# Patient Record
Sex: MALE | Race: WHITE | NOT HISPANIC OR LATINO | URBAN - METROPOLITAN AREA
[De-identification: names, ages, dates, MRNs, and addresses within clinical notes are randomized per-mention and may not be internally consistent; named-entity substitution may affect disease eponyms.]

---

## 2017-01-06 ENCOUNTER — NEW PATIENT (OUTPATIENT)
Dept: URBAN - METROPOLITAN AREA CLINIC 85 | Facility: CLINIC | Age: 72
End: 2017-01-06
Payer: MEDICARE

## 2017-01-06 DIAGNOSIS — H25.13 AGE-RELATED NUCLEAR CATARACT, BILATERAL: Primary | ICD-10-CM

## 2017-01-06 PROCEDURE — 92004 COMPRE OPH EXAM NEW PT 1/>: CPT | Performed by: OPTOMETRIST

## 2017-01-06 ASSESSMENT — KERATOMETRY
OS: 42.25
OD: 42.25

## 2017-01-06 ASSESSMENT — INTRAOCULAR PRESSURE
OD: 15
OS: 15

## 2017-01-06 ASSESSMENT — VISUAL ACUITY
OD: 20/20
OS: 20/20

## 2018-03-19 ENCOUNTER — FOLLOW UP ESTABLISHED (OUTPATIENT)
Dept: URBAN - METROPOLITAN AREA CLINIC 85 | Facility: CLINIC | Age: 73
End: 2018-03-19
Payer: MEDICARE

## 2018-03-19 DIAGNOSIS — H35.372 PUCKERING OF MACULA, LEFT EYE: ICD-10-CM

## 2018-03-19 PROCEDURE — 92014 COMPRE OPH EXAM EST PT 1/>: CPT | Performed by: OPTOMETRIST

## 2018-03-19 ASSESSMENT — VISUAL ACUITY
OD: 20/20
OS: 20/20

## 2018-03-19 ASSESSMENT — INTRAOCULAR PRESSURE
OD: 18
OS: 18

## 2021-11-24 ENCOUNTER — OFFICE VISIT (OUTPATIENT)
Dept: SURGERY | Age: 76
End: 2021-11-24
Payer: COMMERCIAL

## 2021-11-24 VITALS
HEART RATE: 73 BPM | DIASTOLIC BLOOD PRESSURE: 74 MMHG | OXYGEN SATURATION: 99 % | HEIGHT: 72 IN | SYSTOLIC BLOOD PRESSURE: 140 MMHG | TEMPERATURE: 97.7 F | WEIGHT: 178.3 LBS | BODY MASS INDEX: 24.15 KG/M2 | RESPIRATION RATE: 20 BRPM

## 2021-11-24 DIAGNOSIS — C44.92 SCC (SQUAMOUS CELL CARCINOMA): Primary | ICD-10-CM

## 2021-11-24 PROCEDURE — 99214 OFFICE O/P EST MOD 30 MIN: CPT | Performed by: PLASTIC SURGERY

## 2021-11-24 NOTE — PROGRESS NOTES
Department of Plastic Surgery - Adult  Attending Consult Note      CHIEF COMPLAINT:   Squamous Cell Cancer of right hand    History Obtained From:  patient    HISTORY OF PRESENT ILLNESS:                The patient is a 76 y.o. 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 The patient states he noticed this lesion for several months prior to having it biopsied by his dermatologist.  He states he is never had any surgical intervention to this area previously however he has been using Efudex to the area which was prescribed to him previously by the [address] [place] [address].  He states he is otherwise healthy and is following with his primary care physician in the coming weeks. He presents her office today at the request of his dermatologist for excision of the right dorsal hand squamous cell carcinoma    Past Medical History:    Past Medical History:   Diagnosis Date    Known health problems: none     none per pt     Past Surgical History:    Past Surgical History:   Procedure Laterality Date    HERNIA REPAIR      abdominal in 2003 or 2004 per pt    LEG SURGERY      right leg scc       Current Medications:       No current outpatient medications on file. No current facility-administered medications for this visit. Allergies:  Patient has no known allergies.     Social History:   Social History     Socioeconomic History    Marital status: Unknown     Spouse name: Not on file    Number of children: Not on file    Years of education: Not on file    Highest education level: Not on file   Occupational History    Not on file   Tobacco Use    Smoking status: Current Every Day Smoker     Types: Cigarettes    Smokeless tobacco: Never Used    Tobacco comment: smokes 5-6 cigs daily   Vaping Use    Vaping Use: Never used   Substance and Sexual Activity    Alcohol use: Not Currently    Drug use: Never    Sexual activity: Not on file   Other Topics Concern    Not on file   Social History Narrative    Not on file     Social Determinants of Health     Financial Resource Strain:     Difficulty of Paying Living Expenses: Not on file   Food Insecurity:     Worried About Running Out of Food in the Last Year: Not on file    Estella of Food in the Last Year: Not on file   Transportation Needs:     Lack of Transportation (Medical): Not on file    Lack of Transportation (Non-Medical): Not on file   Physical Activity:     Days of Exercise per Week: Not on file    Minutes of Exercise per Session: Not on file   Stress:     Feeling of Stress : Not on file   Social Connections:     Frequency of Communication with Friends and Family: Not on file    Frequency of Social Gatherings with Friends and Family: Not on file    Attends Confucianist Services: Not on file    Active Member of [address] [place] [address] [employer] or Organizations: Not on file    Attends Club or Organization Meetings: Not on file    Marital Status: Not on file   Intimate Partner Violence:     Fear of Current or Ex-Partner: Not on file    Emotionally Abused: Not on file    Physically Abused: Not on file    Sexually Abused: Not on file   Housing Stability:     Unable to Pay for Housing in the Last Year: Not on file    Number of [place] in the Last Year: Not on file    Unstable Housing in the Last Year: Not on file     Family History:   Family History   Problem Relation Age of Onset    No Known Problems Mother     Pacemaker Father        REVIEW OF SYSTEMS:    CONSTITUTIONAL:  negative for  fevers, chills, sweats and fatigue  EYES: negative for dipolpia or acute vision loss. RESPIRATORY:  negative for  dry cough, cough with sputum, dyspnea, wheezing and chest pain  HENT:negative for pain, headache, difficulty swallowing or nose bleeds.   CARDIOVASCULAR:  negative for  chest pain, dyspnea, palpitations, syncope  GASTROINTESTINAL:  negative for nausea, vomiting, change in bowel habits, diarrhea, constipation and abdominal pain  EXTREMITIES: negative for edema  MUSCULOSKELETAL: negative for muscle weakness  SKIN: positive for lesion, negative for itching or rashes. HEME: negative for easy brusing or bleeding  PSYCH: Alert and oriented to person place and time    I have reviewed the chief complaint and history of present illness including (ROS and PFSH) and vital documentation by my staff and I agree with their documentation and have added when applicable. PHYSICAL EXAM:    VITALS:  BP (!) 140/74 (Site: Left Upper Arm, Position: Sitting, Cuff Size: Medium Adult)   Pulse 73   Temp 97.7 °F (36.5 °C) (Infrared)   Resp 20   Ht 6' (1.829 m)   Wt 178 lb 4.8 oz (80.9 kg)   SpO2 99%   BMI 24.18 kg/m²   CONSTITUTIONAL:  awake, alert, cooperative, no apparent distress, and appears stated age  EYES: PERRLA, EOMI, no signs of occular infection  LUNGS:  No increased work of breathing, good air exchange, clear to auscultation bilaterally, no crackles or wheezing  CARDIOVASCULAR:  Normal apical impulse, regular rate and rhythm  EXTREMITIES: no signs of clubbing or cyanosis. MUSCULOSKELETAL: negative for flaccid muscle tone or spastic movements. NEURO: Cranial nerves II-XII grossly intact. No signs of agitated mood. SKIN: Biopsy proven squamous cell carcinoma right dorsal hand-  20mm x 20mm,  pink in color, irregular border, raised, no signs of bleeding,drainage or infection. Non tender to palpation.      Right dorsal hand squamous cell carcinoma 20 mm round  DATA:    Labs: CBC: No results found for: WBC, RBC, HGB, HCT, MCV, MCH, MCHC, RDW, PLT, MPV  BMP:  No results found for: NA, K, CL, CO2, BUN, LABALBU, CREATININE, CALCIUM, GFRAA, LABGLOM, GLUCOSE    Radiology Review:  No radiology needed at this time  Pathology Review:  Pathology reviewed     IMPRESSION/RECOMMENDATIONS:        Diagnosis  -) Right dorsal hand squamous cell carcinoma      -The patient was counseled on their pathology results and the need for surgical   intervention.   -We will plan to proceed and have the lesion formely excised with margins in the OR. -The patient will  require frozen sections in the operating room  -The patient will  require pre-op clearance from their PCP. -The risks, benefits and options were discussed with the pt. The risks included but not limited to pain, bleeding, infection, heavy scarring, damage to surrounding structures, fluid collections, asymmetry, and need for further procedures. All of His questions were answered to their satisfaction and He agrees to proceed with the procedure. Photos obtained    Surgical plan: Excision of right dorsal hand screen cell carcinoma with possible full-thickness skin graft    This document is generated, in part, by voice recognition software and thus  syntax and grammatical errors are possible.     Petrona Garcia MD  1:18 PM  12/1/2021

## 2021-11-29 ENCOUNTER — TELEPHONE (OUTPATIENT)
Dept: SURGERY | Age: 76
End: 2021-11-29

## 2021-11-30 NOTE — TELEPHONE ENCOUNTER
12/6/2021 9:30 [facility] prior to [facility]    Excision squamous cell carcinoma right dorsal hand with possible full thickness skin graft

## 2021-12-03 NOTE — TELEPHONE ENCOUNTER
Medical Clearance faxed to [facility] dept. Procedure: ;excision squamous cell carcinoma right dorsal hand with possible full thickness skin graft     Surgery has been scheduled at [address] on 12/21/2021, Pre-Admission Testing will call you prior to surgery to inform you arrival time and any other additional directions,if they are unable to reach you,please call them two days prior at [phone]. If taking Fish Oil, Vitamins, two weeks prior to surgery stop taking. If taking NSAIDS (such as Aspirin, Ibuprofen) anticoagulants please consult with your prescribing physician to get further instructions on when to stop medication prior to surgery that is scheduled, patient understood. Pre-Auth #:[facility]#[id number] no prior auth required per [employer]  CPT Codes: 34684,92766,07129        Phone discussion was had with the patient today regarding upcoming surgery. I had a discussion with the patient that although the patient's insurance company has approved the procedure proposed, there is no guarantee of payment by [employer] on their final review following the procedure. This is also reflected in the letter received by this office and the patient from the insurance company. The patient has voiced to me complete understanding of this scenario, understands that they will be responsible for their individual deductable/coinsurance balance as an out-of-pocket expense and possibly the financial responsibility of the surgical procedure if the patient's insurance company elects not to pay for certain or all services. The patient elects to proceed with the proposed surgical plan.

## 2021-12-06 NOTE — TELEPHONE ENCOUNTER
Pt stopped in today to let us know he just left the [address] and his pre op med clear will be here today or tomorrow.

## 2021-12-09 NOTE — PROGRESS NOTES
Patient agreed to COVID test on 12/16 at the  [facility], between the hours of 7 am- 3 pm, second floor located at  [place] [address]. Patient instructed to bring ID. Patient instructed to self isolate until day of surgery.

## 2021-12-17 ENCOUNTER — HOSPITAL ENCOUNTER (OUTPATIENT)
Age: 76
Discharge: HOME | End: 2021-12-19
Payer: COMMERCIAL

## 2021-12-17 DIAGNOSIS — U07.1 COVID-19: ICD-10-CM

## 2021-12-17 LAB — SARS-COV-2, PCR: NOT DETECTED

## 2021-12-17 PROCEDURE — U0003 INFECTIOUS AGENT DETECTION BY NUCLEIC ACID (DNA OR RNA); SEVERE ACUTE RESPIRATORY SYNDROME CORONAVIRUS 2 (SARS-COV-2) (CORONAVIRUS DISEASE [COVID-19]), AMPLIFIED PROBE TECHNIQUE, MAKING USE OF HIGH THROUGHPUT TECHNOLOGIES AS DESCRIBED BY CMS-2020-01-R: HCPCS

## 2021-12-17 PROCEDURE — U0005 INFEC AGEN DETEC AMPLI PROBE: HCPCS

## 2021-12-17 RX ORDER — B-COMPLEX WITH VITAMIN C
TABLET ORAL
COMMUNITY

## 2021-12-17 RX ORDER — MULTIVIT WITH MINERALS/LUTEIN
250 TABLET ORAL DAILY
COMMUNITY

## 2021-12-17 RX ORDER — MULTIVIT-MIN/IRON/FOLIC ACID/K 18-600-40
CAPSULE ORAL
COMMUNITY

## 2021-12-17 RX ORDER — VITAMIN B COMPLEX
1 CAPSULE ORAL DAILY
COMMUNITY

## 2021-12-17 NOTE — PROGRESS NOTES
[facility] PRE-ADMISSION TESTING GENERAL INSTRUCTIONS- [facility]-phone number:[phone]    GENERAL INSTRUCTIONS  [x] Antibacterial Soap shower Night before and/or AM of Surgery  [] Jake wipe instruction sheet and wipes given. [x] Nothing by mouth after midnight, including gum, candy, mints, or water. [x] You may brush your teeth, gargle, but do NOT swallow water. []Hibiclens shower  the night before and the morning of surgery. Do not use             Hibiclens on your face or head. [x]No smoking, chewing tobacco, illegal drugs, or alcohol within 24 hours of your surgery. [x] Jewelry, valuables or body piercing's should not be brought to the hospital. All body and/or tongue piercing's must be removed prior to arriving to hospital.  ALL hair pins must be removed. [x] Do not wear makeup, lotions, powders, deodorant. Nail polish as directed by the nurse. [x] Arrange transportation with a responsible adult  to and from the hospital. If you do not have a responsible adult  to transport you, you will need to make arrangements with a medical transportation company (i.e. [employer]. A Uber/taxi/bus is not appropriate unless you are accompanied by a responsible adult ). Arrange for someone to be with you for the remainder of the day and for 24 hours after your procedure due to having had anesthesia. Who will be your  for transportation? _MARLENE_________________   Who will be staying with you for 24 hrs after your procedure?_____MARLENE____________  [x] Bring insurance card and photo ID.  [] Transfusion Bracelet: Please bring with you to hospital, day of surgery  [] Bring urine specimen day of surgery. Any small container is acceptable. [] Use inhalers the morning of surgery and bring with you to hospital.  [] Bring copy of living will or healthcare power of  papers to be placed in your electronic record.   [] CPAP/BI-PAP: Please bring your machine if you are to spend the night in the hospital.     PARKING [id number]:   [x] Arrival Time:__0530___________  · [x] Parking lot '\"I\"  is located on [facility] (the corner of [facility] and [facility]). To enter, press the button and the gate will lift. A free token will be provided to exit the lot. One car per patient is allowed to park in this lot. All other cars are to park on [address] [place] [address] either in the parking garage or the handicap lot. [] To reach the [facility] lobby from [address] [place] [address], upon entering the hospital, take elevator B to the 3rd floor. EDUCATION INSTRUCTIONS:      [] Knee or hip replacement booklet & exercise pamphlets given. [] [address] placed in chart. [] Pre-admission Testing educational folder given  [] Incentive Spirometry,coughing & deep breathing exercises reviewed. []Medication information sheet(s)   []Fluoroscopy-Xray used in surgery reviewed with patient. Educational pamphlet placed in chart. [x]Pain: Post-op pain is normal and to be expected. You will be asked to rate your pain from 0-10(a zero is not acceptable-education is needed). Your post-op pain goal is:2  [] Ask your nurse for your pain medication. [] Joint camp offered. [] Joint replacement booklets given. [] Other:___________________________    MEDICATION INSTRUCTIONS:   [x]Bring a complete list of your medications, please write the last time you took the medicine, give this list to the nurse.   [] Take the following medications the morning of surgery with 1-2 ounces of water:   [x] Stop herbal supplements and vitamins 5 days before your surgery. [] DO NOT take any diabetic medicine the morning of surgery. Follow instructions for insulin the day before surgery. [] If you are diabetic and your blood sugar is low or you feel symptomatic, you may drink 1-2 ounces of apple juice or take a glucose tablet.   The morning of your procedure, you may call the pre-op area if you have concerns about your blood sugar [phone]. [] Use your inhalers the morning of surgery. Bring your emergency inhaler with you day of surgery. [] Follow physician instructions regarding any blood thinners you may be taking. WHAT TO EXPECT:  [x] The day of surgery you will be greeted and checked in by the [employer].  In addition, you will be registered in the [facility] by a Patient Access Representative. Please bring your photo ID and insurance card. A nurse will greet you in accordance to the time you are needed in the pre-op area to prepare you for surgery. Please do not be discouraged if you are not greeted in the order you arrive as there are many variables that are involved in patient preparation. Your patience is greatly appreciated as you wait for your nurse. Please bring in items such as: books, magazines, newspapers, electronics, or any other items  to occupy your time in the waiting area. [x]  Delays may occur with surgery and staff will make a sincere effort to keep you informed of delays. If any delays occur with your procedure, we apologize ahead of time for your inconvenience as we recognize the value of your time.

## 2021-12-20 NOTE — H&P
Sexual Activity    Alcohol use: Not Currently    Drug use: Never    Sexual activity: Not on file   Other Topics Concern    Not on file   Social History Narrative    Not on file      Social Determinants of Health          Financial Resource Strain:     Difficulty of Paying Living Expenses: Not on file   Food Insecurity:     Worried About Running Out of Food in the Last Year: Not on file    Estella of Food in the Last Year: Not on file   Transportation Needs:     Lack of Transportation (Medical): Not on file    Lack of Transportation (Non-Medical): Not on file   Physical Activity:     Days of Exercise per Week: Not on file    Minutes of Exercise per Session: Not on file   Stress:     Feeling of Stress : Not on file   Social Connections:     Frequency of Communication with Friends and Family: Not on file    Frequency of Social Gatherings with Friends and Family: Not on file    Attends Muslim Services: Not on file    Active Member of [address] [place] [address] or Organizations: Not on file    Attends Club or Organization Meetings: Not on file    Marital Status: Not on file   Intimate Partner Violence:     Fear of Current or Ex-Partner: Not on file    Emotionally Abused: Not on file    Physically Abused: Not on file    Sexually Abused: Not on file   Housing Stability:     Unable to Pay for Housing in the Last Year: Not on file    Number of [place] in the Last Year: Not on file    Unstable Housing in the Last Year: Not on file         Family History:   Family History         Family History   Problem Relation Age of Onset    No Known Problems Mother      Pacemaker Father              REVIEW OF SYSTEMS:    CONSTITUTIONAL:  negative for  fevers, chills, sweats and fatigue  EYES: negative for dipolpia or acute vision loss. RESPIRATORY:  negative for  dry cough, cough with sputum, dyspnea, wheezing and chest pain  HENT:negative for pain, headache, difficulty swallowing or nose bleeds.   CARDIOVASCULAR: negative for  chest pain, dyspnea, palpitations, syncope  GASTROINTESTINAL:  negative for nausea, vomiting, change in bowel habits, diarrhea, constipation and abdominal pain  EXTREMITIES: negative for edema  MUSCULOSKELETAL: negative for muscle weakness  SKIN: positive for lesion, negative for itching or rashes. HEME: negative for easy brusing or bleeding  PSYCH: Alert and oriented to person place and time     I have reviewed the chief complaint and history of present illness including (ROS and PFSH) and vital documentation by my staff and I agree with their documentation and have added when applicable.        PHYSICAL EXAM:    VITALS:  BP (!) 140/74 (Site: Left Upper Arm, Position: Sitting, Cuff Size: Medium Adult)   Pulse 73   Temp 97.7 °F (36.5 °C) (Infrared)   Resp 20   Ht 6' (1.829 m)   Wt 178 lb 4.8 oz (80.9 kg)   SpO2 99%   BMI 24.18 kg/m²   CONSTITUTIONAL:  awake, alert, cooperative, no apparent distress, and appears stated age  EYES: PERRLA, EOMI, no signs of occular infection  LUNGS:  No increased work of breathing, good air exchange, clear to auscultation bilaterally, no crackles or wheezing  CARDIOVASCULAR:  Normal apical impulse, regular rate and rhythm  EXTREMITIES: no signs of clubbing or cyanosis. MUSCULOSKELETAL: negative for flaccid muscle tone or spastic movements. NEURO: Cranial nerves II-XII grossly intact. No signs of agitated mood.      SKIN: Biopsy proven squamous cell carcinoma right dorsal hand-  20mm x 20mm,  pink in color, irregular border, raised, no signs of bleeding,drainage or infection.  Non tender to palpation.      Right dorsal hand squamous cell carcinoma 20 mm round  DATA:    Labs: CBC: No results found for: WBC, RBC, HGB, HCT, MCV, MCH, MCHC, RDW, PLT, MPV  BMP:  No results found for: NA, K, CL, CO2, BUN, LABALBU, CREATININE, CALCIUM, GFRAA, LABGLOM, GLUCOSE     Radiology Review:  No radiology needed at this time  Pathology Review:  Pathology reviewed    IMPRESSION/RECOMMENDATIONS:          Diagnosis  -) Right dorsal hand squamous cell carcinoma        -The patient was counseled on their pathology results and the need for surgical   intervention.   -We will plan to proceed and have the lesion formely excised with margins in the OR. -The patient will  require frozen sections in the operating room  -The patient will  require pre-op clearance from their PCP.          -The risks, benefits and options were discussed with the pt. The risks included but not limited to pain, bleeding, infection, heavy scarring, damage to surrounding structures, fluid collections, asymmetry, and need for further procedures. All of His questions were answered to their satisfaction and He agrees to proceed with the procedure.     Photos obtained     Surgical plan: Excision of right dorsal hand screen cell carcinoma with possible full-thickness skin graft     Attestation    No change in patient's H & P. I have reviewed the procedure with the patient as well as the risk and benefits. They have no further questions and agree to proceed with surgery.     Ro Smalls MD   7:19 AM  12/21/2021

## 2021-12-21 ENCOUNTER — HOSPITAL ENCOUNTER (OUTPATIENT)
Age: 76
Setting detail: OUTPATIENT SURGERY
Discharge: HOME | End: 2021-12-21
Attending: PLASTIC SURGERY | Admitting: PLASTIC SURGERY
Payer: COMMERCIAL

## 2021-12-21 ENCOUNTER — ANESTHESIA (OUTPATIENT)
Dept: OPERATING ROOM | Age: 76
End: 2021-12-21
Payer: COMMERCIAL

## 2021-12-21 ENCOUNTER — ANESTHESIA EVENT (OUTPATIENT)
Dept: OPERATING ROOM | Age: 76
End: 2021-12-21
Payer: COMMERCIAL

## 2021-12-21 VITALS
DIASTOLIC BLOOD PRESSURE: 68 MMHG | HEART RATE: 58 BPM | HEIGHT: 72 IN | BODY MASS INDEX: 24.38 KG/M2 | TEMPERATURE: 97.1 F | RESPIRATION RATE: 14 BRPM | OXYGEN SATURATION: 99 % | SYSTOLIC BLOOD PRESSURE: 139 MMHG | WEIGHT: 180 LBS

## 2021-12-21 VITALS — DIASTOLIC BLOOD PRESSURE: 67 MMHG | OXYGEN SATURATION: 99 % | SYSTOLIC BLOOD PRESSURE: 131 MMHG

## 2021-12-21 DIAGNOSIS — G89.18 POST-OP PAIN: ICD-10-CM

## 2021-12-21 DIAGNOSIS — U07.1 COVID-19: Primary | ICD-10-CM

## 2021-12-21 PROCEDURE — 6370000000 HC RX 637 (ALT 250 FOR IP): Performed by: PLASTIC SURGERY

## 2021-12-21 PROCEDURE — 6360000002 HC RX W HCPCS: Performed by: PHYSICIAN ASSISTANT

## 2021-12-21 PROCEDURE — 11622 EXC S/N/H/F/G MAL+MRG 1.1-2: CPT | Performed by: PLASTIC SURGERY

## 2021-12-21 PROCEDURE — 2500000003 HC RX 250 WO HCPCS: Performed by: PLASTIC SURGERY

## 2021-12-21 PROCEDURE — 2709999900 HC NON-CHARGEABLE SUPPLY: Performed by: PLASTIC SURGERY

## 2021-12-21 PROCEDURE — 3700000000 HC ANESTHESIA ATTENDED CARE: Performed by: PLASTIC SURGERY

## 2021-12-21 PROCEDURE — 7100000010 HC PHASE II RECOVERY - FIRST 15 MIN: Performed by: PLASTIC SURGERY

## 2021-12-21 PROCEDURE — 6360000002 HC RX W HCPCS: Performed by: NURSE ANESTHETIST, CERTIFIED REGISTERED

## 2021-12-21 PROCEDURE — 2580000003 HC RX 258: Performed by: NURSE ANESTHETIST, CERTIFIED REGISTERED

## 2021-12-21 PROCEDURE — 15240 FTH/GFT F/C/C/M/N/AX/G/H/F20: CPT | Performed by: PLASTIC SURGERY

## 2021-12-21 PROCEDURE — 3700000001 HC ADD 15 MINUTES (ANESTHESIA): Performed by: PLASTIC SURGERY

## 2021-12-21 PROCEDURE — 88331 PATH CONSLTJ SURG 1 BLK 1SPC: CPT

## 2021-12-21 PROCEDURE — 3600000002 HC SURGERY LEVEL 2 BASE: Performed by: PLASTIC SURGERY

## 2021-12-21 PROCEDURE — 3600000012 HC SURGERY LEVEL 2 ADDTL 15MIN: Performed by: PLASTIC SURGERY

## 2021-12-21 PROCEDURE — 88305 TISSUE EXAM BY PATHOLOGIST: CPT

## 2021-12-21 PROCEDURE — 2580000003 HC RX 258: Performed by: PHYSICIAN ASSISTANT

## 2021-12-21 PROCEDURE — 7100000011 HC PHASE II RECOVERY - ADDTL 15 MIN: Performed by: PLASTIC SURGERY

## 2021-12-21 RX ORDER — DEXAMETHASONE SODIUM PHOSPHATE 10 MG/ML
INJECTION INTRAMUSCULAR; INTRAVENOUS PRN
Status: DISCONTINUED | OUTPATIENT
Start: 2021-12-21 | End: 2021-12-21 | Stop reason: SDUPTHER

## 2021-12-21 RX ORDER — SODIUM CHLORIDE 9 MG/ML
INJECTION, SOLUTION INTRAVENOUS CONTINUOUS PRN
Status: DISCONTINUED | OUTPATIENT
Start: 2021-12-21 | End: 2021-12-21 | Stop reason: SDUPTHER

## 2021-12-21 RX ORDER — DIAPER,BRIEF,INFANT-TODD,DISP
EACH MISCELLANEOUS PRN
Status: DISCONTINUED | OUTPATIENT
Start: 2021-12-21 | End: 2021-12-21 | Stop reason: ALTCHOICE

## 2021-12-21 RX ORDER — BACITRACIN 500 [USP'U]/G
OINTMENT OPHTHALMIC
Qty: 1 EACH | Refills: 0 | Status: SHIPPED | OUTPATIENT
Start: 2021-12-21

## 2021-12-21 RX ORDER — SODIUM CHLORIDE 0.9 % (FLUSH) 0.9 %
5-40 SYRINGE (ML) INJECTION PRN
Status: DISCONTINUED | OUTPATIENT
Start: 2021-12-21 | End: 2021-12-21 | Stop reason: HOSPADM

## 2021-12-21 RX ORDER — PROPOFOL 10 MG/ML
INJECTION, EMULSION INTRAVENOUS CONTINUOUS PRN
Status: DISCONTINUED | OUTPATIENT
Start: 2021-12-21 | End: 2021-12-21 | Stop reason: SDUPTHER

## 2021-12-21 RX ORDER — ONDANSETRON 2 MG/ML
INJECTION INTRAMUSCULAR; INTRAVENOUS PRN
Status: DISCONTINUED | OUTPATIENT
Start: 2021-12-21 | End: 2021-12-21 | Stop reason: SDUPTHER

## 2021-12-21 RX ORDER — LIDOCAINE HYDROCHLORIDE AND EPINEPHRINE 10; 10 MG/ML; UG/ML
INJECTION, SOLUTION INFILTRATION; PERINEURAL PRN
Status: DISCONTINUED | OUTPATIENT
Start: 2021-12-21 | End: 2021-12-21 | Stop reason: ALTCHOICE

## 2021-12-21 RX ORDER — SODIUM CHLORIDE 0.9 % (FLUSH) 0.9 %
5-40 SYRINGE (ML) INJECTION EVERY 12 HOURS SCHEDULED
Status: DISCONTINUED | OUTPATIENT
Start: 2021-12-21 | End: 2021-12-21 | Stop reason: HOSPADM

## 2021-12-21 RX ORDER — FENTANYL CITRATE 50 UG/ML
INJECTION, SOLUTION INTRAMUSCULAR; INTRAVENOUS PRN
Status: DISCONTINUED | OUTPATIENT
Start: 2021-12-21 | End: 2021-12-21 | Stop reason: SDUPTHER

## 2021-12-21 RX ORDER — CEPHALEXIN 500 MG/1
500 CAPSULE ORAL 4 TIMES DAILY
Qty: 20 CAPSULE | Refills: 0 | Status: SHIPPED | OUTPATIENT
Start: 2021-12-21 | End: 2021-12-26

## 2021-12-21 RX ORDER — SODIUM CHLORIDE 9 MG/ML
25 INJECTION, SOLUTION INTRAVENOUS PRN
Status: DISCONTINUED | OUTPATIENT
Start: 2021-12-21 | End: 2021-12-21 | Stop reason: HOSPADM

## 2021-12-21 RX ORDER — LIDOCAINE HYDROCHLORIDE 20 MG/ML
INJECTION, SOLUTION INTRAVENOUS PRN
Status: DISCONTINUED | OUTPATIENT
Start: 2021-12-21 | End: 2021-12-21 | Stop reason: SDUPTHER

## 2021-12-21 RX ORDER — HYDROCODONE BITARTRATE AND ACETAMINOPHEN 5; 325 MG/1; MG/1
1 TABLET ORAL EVERY 6 HOURS PRN
Qty: 10 TABLET | Refills: 0 | Status: SHIPPED | OUTPATIENT
Start: 2021-12-21 | End: 2021-12-28

## 2021-12-21 RX ADMIN — DEXAMETHASONE SODIUM PHOSPHATE 10 MG: 10 INJECTION INTRAMUSCULAR; INTRAVENOUS at 07:40

## 2021-12-21 RX ADMIN — SODIUM CHLORIDE: 9 INJECTION, SOLUTION INTRAVENOUS at 07:27

## 2021-12-21 RX ADMIN — Medication 2000 MG: at 07:39

## 2021-12-21 RX ADMIN — LIDOCAINE HYDROCHLORIDE 80 MG: 20 INJECTION, SOLUTION INTRAVENOUS at 07:36

## 2021-12-21 RX ADMIN — SODIUM CHLORIDE 25 ML: 9 INJECTION, SOLUTION INTRAVENOUS at 06:13

## 2021-12-21 RX ADMIN — PROPOFOL 75 MCG/KG/MIN: 10 INJECTION, EMULSION INTRAVENOUS at 07:36

## 2021-12-21 RX ADMIN — ONDANSETRON HYDROCHLORIDE 4 MG: 2 SOLUTION INTRAMUSCULAR; INTRAVENOUS at 07:44

## 2021-12-21 RX ADMIN — FENTANYL CITRATE 50 MCG: 50 INJECTION, SOLUTION INTRAMUSCULAR; INTRAVENOUS at 07:36

## 2021-12-21 ASSESSMENT — PULMONARY FUNCTION TESTS
PIF_VALUE: 1
PIF_VALUE: 0
PIF_VALUE: 1
PIF_VALUE: 1
PIF_VALUE: 0
PIF_VALUE: 1
PIF_VALUE: 0
PIF_VALUE: 1
PIF_VALUE: 0
PIF_VALUE: 1
PIF_VALUE: 0
PIF_VALUE: 1
PIF_VALUE: 0
PIF_VALUE: 1

## 2021-12-21 ASSESSMENT — LIFESTYLE VARIABLES: SMOKING_STATUS: 1

## 2021-12-21 ASSESSMENT — PAIN SCALES - GENERAL
PAINLEVEL_OUTOF10: 0

## 2021-12-21 ASSESSMENT — PAIN - FUNCTIONAL ASSESSMENT: PAIN_FUNCTIONAL_ASSESSMENT: 0-10

## 2021-12-21 NOTE — PROGRESS NOTES
Patient tolerating PO fluids. Family at bedside. Reviewed discharge instructions including discharge medications, wound care, follow up appointment and s/s of infection. Patient and wife verbalizes understanding. No questions at this time.

## 2021-12-21 NOTE — OP NOTE
[address]                  [address] [place],  [facility]                                OPERATIVE REPORT    PATIENT NAME: Lai Goodrich                         :        1945  MED REC NO:   [id number]                            ROOM:  ACCOUNT NO:   [de-identified]                           ADMIT DATE: 2021  PROVIDER:     Marley Morris DO    DATE OF PROCEDURE:  2021    PREOPERATIVE DIAGNOSIS:  Squamous cell carcinoma of the right dorsal  hand. POSTOPERATIVE DIAGNOSIS:  Squamous cell carcinoma of the right dorsal  hand. PROCEDURE:  Excision of squamous cell carcinoma of right dorsal hand  with full-thickness skin graft. Lesion was measured 12 x 12 mm, margin  was 4 mm, wound defect was 40 x 40 mm and full-thickness skin graft was  20 x 20 mm. SURGEON:  Anay Harden MD    ASSISTANTS:  Marley Morris DO, PGY-5 and Wendy Quinonez. The PA  was required due to the inadequate trained assistant who assisted in  prepping, draping, suturing, retracting and dressing. EBL:  5 mL. IV FLUID:  500 mL. ANESTHESIA:  General, MAC.    COMPLICATIONS:  None. SPECIMEN:  Were obtained. 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 Risks, benefits, and alternatives were  discussed. Risks including but not limited to pain, bleeding,  infection, damage to surrounding structures, seroma, hematoma, heavy  scarring, recurrence, and the need for further surgery. The patient  voiced understanding and elected proceed. DESCRIPTION OF PROCEDURE:  The patient was brought to the room, placed  supine on the operating table. SCDs were placed. Preoperative  antibiotic was administered. A time-out was done and was agreed by all  parties present. Anesthesia was induced without any complication.   The  patient was then prepped and draped in usual sterile fashion. The  lesion was then marked with 4 mm margins and then the skin was  infiltrated with 1% lidocaine with 1:200,000 epinephrine. The diameter  of the defect was also measured at the full-thickness skin graft site on  the right supraclavicular area and the skin was then infiltrated with 1%  lidocaine with 1:100,000 epinephrine. Attention was returned to the the  dorsal hand lesion. By using a 15-blade scalpel, incision was then made  perpendicular to the marked site and carried down to just above the  subcutaneous tissues. The lesion was then lifted up from the wound bed  uniformly and sent to pathology for frozen section. Intraoperative  report shows representative margins were negative of malignancy. Hemostasis was achieved with monopolar cautery and the wound was  irrigated with copious amounts of normal saline. Attention was then  returned to the right supraclavicular region with the full-thickness  skin graft site was marked and by using 15-blade scalpel, incision was  then made perpendicular to the skin and carried down to the subcutaneous  tissues. The graft was then lifted up from the wound bed and was  denuded from any subcutaneous and adipose tissues. This was then  fashioned and placed into the wound defect of a right dorsal hand. The  graft was then secured with 4-0 chromic in simple running fashion and a  bolster dressing with Xeroform was placed on top of the graft which was  also secured with several silk sutures. The supraclavicular area was  closed with 3-0 Monocryl in the deep dermal layer in simple interrupted  fashion. The skin was closed with 4-0 Monocryl in subcuticular running  fashion, was dressed with Dermabond and Steri-Strips. At this point,  the procedure deemed to be completed. The patient was then returned to  Anesthesia. He emerged anesthesia without any complication and sent to  PACU in stable addition.   Dr. Marisela Smart was present and scrubbed throughout  the entire case.         Mellisa Reyna DO    D: 12/21/2021 8:24:57       T: 12/21/2021 8:29:43     SANDRA/S_HECTOR_Scottie  Job#: [id number]     Doc#: [id number]    CC:

## 2021-12-21 NOTE — ANESTHESIA POSTPROCEDURE EVALUATION
Department of Anesthesiology  Postprocedure Note    Patient: Wanda Calabrese  MRN: [id number]  YOB: 1945  Date of evaluation: 12/21/2021  Time:  8:18 AM     Procedure Summary     Date: 12/21/21 Room / Location: [facility] OR 10 / [facility]    Anesthesia Start: 8940 Anesthesia Stop: 0818    Procedure: EXC OF SCC RIGHT FORSAL HAND WITH FULL THICKNES SKIN GRAFT (Right ) Diagnosis: (SQUAMOUS CELL CARCINOMA RIGHT DORSAL HAND)    Surgeons: Alexandra العراقي MD Responsible Provider: Edson Hodgson MD    Anesthesia Type: MAC ASA Status: 2          Anesthesia Type: MAC    Malu Phase I: Malu Score: 10    Malu Phase II:      Last vitals: Reviewed and per EMR flowsheets.        Anesthesia Post Evaluation    Patient location during evaluation: PACU  Patient participation: complete - patient participated  Level of consciousness: awake and alert  Pain score: 0  Airway patency: patent  Nausea & Vomiting: no nausea and no vomiting  Complications: no  Cardiovascular status: hemodynamically stable  Respiratory status: acceptable  Hydration status: euvolemic    KRISTA washburn - CRNA

## 2021-12-21 NOTE — ANESTHESIA PRE PROCEDURE
Department of Anesthesiology  Preprocedure Note       Name:  Wanda Calabrese   Age:  76 y.o.  :  1945                                          MRN:  [id number]         Date:  2021      Surgeon: Jerson Macdonald):  Alexandra العراقي MD    Procedure: Procedure(s):  EXCISION SQUAMOUS CELL CARCINOMA RIGHT DORSAL HAND WITH POSSIBLE FULL SKIN GRAFT. [facility]. [facility] WITH LOCAL ANESTHESIA    Vital Signs (Current)   Vitals:    21   BP: (!) 165/72   Pulse: 72   Resp: 16   Temp: 36.1 °C (96.9 °F)   SpO2: 98%     Vital Signs Statistics (for past 48 hrs)     Temp  Av.1 °C (96.9 °F)  Min: 36.1 °C (96.9 °F)   Min taken time: 21  Max: 36.1 °C (96.9 °F)   Max taken time: 21  Pulse  Av  Min: 67   Min taken time: 21  Max: 72   Max taken time: 21  Resp  Av  Min: 16   Min taken time: 21  Max: 16   Max taken time: 2148  BP  Min: 165/72   Min taken time: 21  Max: 165/72   Max taken time: 21  SpO2  Av %  Min: 98 %   Min taken time: 21  Max: 98 %   Max taken time: 21 0548    BP Readings from Last 3 Encounters:   21 (!) 165/72   21 (!) 140/74     BMI  Body mass index is 24.41 kg/m². Estimated body mass index is 24.41 kg/m² as calculated from the following:    Height as of this encounter: 6' (1.829 m). Weight as of this encounter: 180 lb (81.6 kg). CBC No results found for: WBC, RBC, HGB, HCT, MCV, RDW, PLT  CMP  No results found for: NA, K, CL, CO2, BUN, CREATININE, GFRAA, AGRATIO, LABGLOM, GLUCOSE, PROT, CALCIUM, BILITOT, ALKPHOS, AST, ALT  BMP  No results found for: NA, K, CL, CO2, BUN, CREATININE, CALCIUM, GFRAA, LABGLOM, GLUCOSE  POCGlucose  No results for input(s): GLUCOSE in the last 72 hours.    Coags  No results found for: PROTIME, INR, APTT  HCG (If Applicable) No results found for: PREGTESTUR, PREGSERUM, HCG, HCGQUANT   ABGs No results found for: PHART, PO2ART, VLC5FLH, PVS8RWE, BEART, V5PEGRZQ   Type & Screen (If Applicable)  No results found for: [address]  Radiology (If Applicable)  Cardiac Testing (If Applicable)   EKG (If Applicable)   Medications prior to admission:   Prior to Admission medications    Medication Sig Start Date End Date Taking? Authorizing Provider   cephALEXin (KEFLEX) 500 MG capsule Take 1 capsule by mouth 4 times daily for 5 days 12/21/21 12/26/21 Yes HELEN Nicholson   bacitracin 500 UNIT/GM ophthalmic ointment Place over eyelid incisions twice daily 12/21/21  Yes HELEN Nicholson   HYDROcodone-acetaminophen (NORCO) 5-325 MG per tablet Take 1 tablet by mouth every 6 hours as needed for Pain for up to 7 days. 12/21/21 12/28/21 Yes HELEN Nicholson   Cholecalciferol (VITAMIN D) 50 MCG (2000 UT) CAPS capsule Take by mouth   Yes Historical Provider, MD   Ascorbic Acid (VITAMIN C) 250 MG tablet Take 250 mg by mouth daily   Yes Historical Provider, MD   Zinc 100 MG TABS Take by mouth   Yes Historical Provider, MD   b complex vitamins capsule Take 1 capsule by mouth daily   Yes Historical Provider, MD       Current medications:    Current Facility-Administered Medications   Medication Dose Route Frequency Provider Last Rate Last Admin    sodium chloride flush 0.9 % injection 5-40 mL  5-40 mL IntraVENous 2 times per day HELEN Nicholson        sodium chloride flush 0.9 % injection 5-40 mL  5-40 mL IntraVENous PRN HELEN Nicholson        0.9 % sodium chloride infusion  25 mL IntraVENous PRN HELEN Nicholson 100 mL/hr at 12/21/21 0613 25 mL at 12/21/21 2861    ceFAZolin (ANCEF) 2000 mg in sterile water 20 mL IV syringe  2,000 mg IntraVENous See Admin Instructions HELEN Nicholson           Allergies:  No Known Allergies    Problem List:  There is no problem list on file for this patient.       Past Medical History:        Diagnosis Date    Known health problems: none     none per pt    Sleep apnea     BI-PAP       Past Surgical History:        Procedure Laterality Date    HERNIA REPAIR      abdominal in 2003 or 2004 per pt    LEG SURGERY      right leg scc         Social History:    Social History     Tobacco Use    Smoking status: Current Every Day Smoker     Types: Cigarettes    Smokeless tobacco: Never Used    Tobacco comment: smokes 5-6 cigs daily   Substance Use Topics    Alcohol use: Not Currently                                Ready to quit: Not Answered  Counseling given: Not Answered  Comment: smokes 5-6 cigs daily      Vital Signs (Current):   Vitals:    12/17/21 0935 12/21/21 0548   BP:  (!) 165/72   Pulse:  72   Resp:  16   Temp:  36.1 °C (96.9 °F)   TempSrc:  Temporal   SpO2:  98%   Weight: 180 lb (81.6 kg) 180 lb (81.6 kg)   Height: 6' (1.829 m) 6' (1.829 m)                                              BP Readings from Last 3 Encounters:   12/21/21 (!) 165/72   11/24/21 (!) 140/74       NPO Status: Time of last liquid consumption: 2200                        Time of last solid consumption: 2200                        Date of last liquid consumption: 12/20/21                        Date of last solid food consumption: 12/20/21    BMI:   Wt Readings from Last 3 Encounters:   12/21/21 180 lb (81.6 kg)   11/24/21 178 lb 4.8 oz (80.9 kg)     Body mass index is 24.41 kg/m². CBC: No results found for: WBC, RBC, HGB, HCT, MCV, RDW, PLT    CMP: No results found for: NA, K, CL, CO2, BUN, CREATININE, GFRAA, AGRATIO, LABGLOM, GLUCOSE, PROT, CALCIUM, BILITOT, ALKPHOS, AST, ALT    POC Tests: No results for input(s): POCGLU, POCNA, POCK, POCCL, POCBUN, POCHEMO, POCHCT in the last 72 hours. Coags: No results found for: PROTIME, INR, APTT    HCG (If Applicable): No results found for: PREGTESTUR, PREGSERUM, HCG, HCGQUANT     ABGs: No results found for: PHART, PO2ART, GBG4IYP, LWV4XXC, BEART, R2VWSTAE     Type & Screen (If Applicable):  No results found for: LABABO, LABRH    Drug/Infectious Status (If Applicable):   No results found for: HIV, HEPCAB    COVID-19 Screening (If Applicable):   Lab Results   Component Value Date    COVID19 Not Detected 12/16/2021           Anesthesia Evaluation  Patient summary reviewed and Nursing notes reviewed no history of anesthetic complications:   Airway: Mallampati: III  TM distance: >3 FB   Neck ROM: full  Mouth opening: < 3 FB Dental:    (+) caps      Pulmonary: breath sounds clear to auscultation  (+) sleep apnea: on CPAP,  current smoker          Patient did not smoke on day of surgery. Cardiovascular:  Exercise tolerance: good (>4 METS),                     Neuro/Psych:               GI/Hepatic/Renal:             Endo/Other:                     Abdominal:             Vascular: Other Findings:             Anesthesia Plan      MAC     ASA 2     (#20 L hand)  Induction: intravenous. Anesthetic plan and risks discussed with patient. Use of blood products discussed with patient whom. Plan discussed with attending.                   KRISTA Escalona - CRNA   12/21/2021

## 2021-12-28 ENCOUNTER — OFFICE VISIT (OUTPATIENT)
Dept: SURGERY | Age: 76
End: 2021-12-28

## 2021-12-28 VITALS — TEMPERATURE: 96.5 F

## 2021-12-28 DIAGNOSIS — C44.92 SCC (SQUAMOUS CELL CARCINOMA): Primary | ICD-10-CM

## 2021-12-28 PROCEDURE — 99024 POSTOP FOLLOW-UP VISIT: CPT | Performed by: PHYSICIAN ASSISTANT

## 2022-01-17 ENCOUNTER — VIRTUAL VISIT (OUTPATIENT)
Dept: SURGERY | Age: 77
End: 2022-01-17

## 2022-01-17 DIAGNOSIS — C44.92 SCC (SQUAMOUS CELL CARCINOMA): Primary | ICD-10-CM

## 2022-01-17 PROCEDURE — 99999 PR OFFICE/OUTPT VISIT,PROCEDURE ONLY: CPT | Performed by: PHYSICIAN ASSISTANT

## 2022-01-19 NOTE — PROGRESS NOTES
Kerri Samuels is a 68 y.o. male evaluated via telephone on 1/17/2022. Consent:  He and/or health care decision maker is aware that that he may receive a bill for this telephone service, depending on his insurance coverage, and has provided verbal consent to proceed: Yes    The patient was in the [address] during the entirety of the phone conversation. Documentation:  I communicated with the patient and/or health care decision maker about the pathology results from their most recent biopsy.    Details of this discussion including any medical advice provided:     Pathology [address] [id number]     [address], [address] [place] [address]                                                           [id number]               FINAL SURGICAL PATHOLOGY REPORT     NAME:           Sarahi Salamanca             Date of       12/21/2021                                            Collection:   Medical Record   [id number]              Date of       12/21/2021   Number:                                  Receipt:   Age:  72 Y        Sex:  M                Date          12/29/2021 16:12                                            Reported:   Date Of Birth:   1945   Financial        [id number]             Admitting     [facility]   Number:                                  Physician:   Patient          KATIE DACOSTA         Ordering      TERRI KANG   Location:                                Physician:     Accession Number:  [id number]             Diagnosis:   Right dorsum hand, excision: 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   Patricia Perez                                   (Electronic Signature)         As the patient states his full-thickness skin graft is well-healed and has no complaints and will have can begin scar massage to this area and cover with Vaseline as needed. Advised patient that as he does have a surgical margin with severe squamous dysplasia and his surgical margins negative for invasive carcinoma I will see him back in 6 months for a wound check of this area advised patient should he note any new lesion formation prior to that time to return to our office. As the patient has stated again he is well-healed and has no complaints I did not advise he needs to return to the office for further examination. However, I did explain to the patient should he have any concerns at all to contact our office and we are more than happy to see him prior to his 6-month follow-up. I affirm this is a Patient Initiated Episode with a Patient who has not had a related appointment within my department in the past 7 days or scheduled within the next 24 hours. Patient identification was verified at the start of the visit: Yes    Total Time: minutes: <5 minutes (not billable)    The visit was conducted pursuant to the emergency declaration under the [facility], [address] [place] [address] authority and the [employer] General Act. Patient identification was verified, and a caregiver was present when appropriate. The patient was located in a state where the provider was credentialed to provide care.     Note: not billable if this call serves to triage the patient into an appointment for the relevant concern      HELEN Barba

## 2022-07-26 ENCOUNTER — TELEPHONE (OUTPATIENT)
Dept: CASE MANAGEMENT | Age: 77
End: 2022-07-26

## 2022-07-26 NOTE — TELEPHONE ENCOUNTER
I called the patient and he confirmed his CT lung screening at [facility] on 7/27/2022 at 1:30 pm.  I reminded the patient to arrive at 1:00 pm, enter through the main entrance, and register. Patient confirmed.           Electronically signed by Shanna Began on 7/26/22 at 3:51 PM EDT

## 2022-07-27 ENCOUNTER — HOSPITAL ENCOUNTER (OUTPATIENT)
Dept: CT IMAGING | Age: 77
Discharge: HOME | End: 2022-07-29
Payer: COMMERCIAL

## 2022-07-27 DIAGNOSIS — F17.200 CURRENT SMOKER: ICD-10-CM

## 2022-07-27 PROCEDURE — 71271 CT THORAX LUNG CANCER SCR C-: CPT

## 2022-07-28 ENCOUNTER — TELEPHONE (OUTPATIENT)
Dept: CASE MANAGEMENT | Age: 77
End: 2022-07-28

## 2022-07-28 NOTE — TELEPHONE ENCOUNTER
No call, encounter opened to process CT Lung Screening. CT Lung Screen: 7/27/2022    Impression   1. There is no pulmonary infiltrate, mass or suspicious pulmonary nodule. 2. Emphysematous changes. LUNG RADS:   Per ACR Lung-RADS Version 1.1       Category 2, Benign appearance or behavior. Management:  Continue annual lung screening with LDCT in 12 months. RECOMMENDATIONS:   If you would like to register your patient with the [employer] [facility], please contact the Nurse Navigator at   [phone]. Pack years: 61    Social History     Tobacco Use  Smoking Status: Current Every Day Smoker    Start Date:    Quit Date:    Types: Cigarettes   Packs/Day: 1   Years: 61   Pack Years: 61   Smokeless Tobacco: Never         Results letter sent to patient via my chart or mailed.      [address]

## 2023-05-14 ENCOUNTER — APPOINTMENT (OUTPATIENT)
Dept: CT IMAGING | Age: 78
End: 2023-05-14
Payer: OTHER GOVERNMENT

## 2023-05-14 ENCOUNTER — HOSPITAL ENCOUNTER (EMERGENCY)
Age: 78
Discharge: HOME | End: 2023-05-14
Attending: EMERGENCY MEDICINE
Payer: OTHER GOVERNMENT

## 2023-05-14 VITALS
RESPIRATION RATE: 14 BRPM | DIASTOLIC BLOOD PRESSURE: 57 MMHG | TEMPERATURE: 98.2 F | HEIGHT: 72 IN | BODY MASS INDEX: 20.05 KG/M2 | SYSTOLIC BLOOD PRESSURE: 111 MMHG | OXYGEN SATURATION: 93 % | WEIGHT: 148 LBS | HEART RATE: 66 BPM

## 2023-05-14 DIAGNOSIS — J18.9 PNEUMONIA DUE TO INFECTIOUS ORGANISM, UNSPECIFIED LATERALITY, UNSPECIFIED PART OF LUNG: Primary | ICD-10-CM

## 2023-05-14 LAB
ALBUMIN SERPL-MCNC: 4.3 G/DL (ref 3.5–5.2)
ALP SERPL-CCNC: 112 U/L (ref 40–129)
ALT SERPL-CCNC: 22 U/L (ref 0–40)
ANION GAP SERPL CALCULATED.3IONS-SCNC: 13 MMOL/L (ref 7–16)
AST SERPL-CCNC: 33 U/L (ref 0–39)
B PARAP IS1001 DNA NPH QL NAA+NON-PROBE: NOT DETECTED
B PERT.PT PRMT NPH QL NAA+NON-PROBE: NOT DETECTED
BASOPHILS # BLD: 0.01 E9/L (ref 0–0.2)
BASOPHILS NFR BLD: 0.1 % (ref 0–2)
BILIRUB SERPL-MCNC: 1.7 MG/DL (ref 0–1.2)
BNP BLD-MCNC: 581 PG/ML (ref 0–450)
BUN SERPL-MCNC: 15 MG/DL (ref 6–23)
C PNEUM DNA NPH QL NAA+NON-PROBE: NOT DETECTED
CALCIUM SERPL-MCNC: 9.1 MG/DL (ref 8.6–10.2)
CHLORIDE SERPL-SCNC: 94 MMOL/L (ref 98–107)
CO2 SERPL-SCNC: 25 MMOL/L (ref 22–29)
CREAT SERPL-MCNC: 1 MG/DL (ref 0.7–1.2)
EOSINOPHIL # BLD: 0.01 E9/L (ref 0.05–0.5)
EOSINOPHIL NFR BLD: 0.1 % (ref 0–6)
ERYTHROCYTE [DISTWIDTH] IN BLOOD BY AUTOMATED COUNT: 12.8 FL (ref 11.5–15)
FLUAV RNA NPH QL NAA+NON-PROBE: NOT DETECTED
FLUBV RNA NPH QL NAA+NON-PROBE: NOT DETECTED
GLUCOSE SERPL-MCNC: 114 MG/DL (ref 74–99)
HADV DNA NPH QL NAA+NON-PROBE: NOT DETECTED
HCOV 229E RNA NPH QL NAA+NON-PROBE: NOT DETECTED
HCOV HKU1 RNA NPH QL NAA+NON-PROBE: NOT DETECTED
HCOV NL63 RNA NPH QL NAA+NON-PROBE: NOT DETECTED
HCOV OC43 RNA NPH QL NAA+NON-PROBE: NOT DETECTED
HCT VFR BLD AUTO: 39.3 % (ref 37–54)
HGB BLD-MCNC: 13.3 G/DL (ref 12.5–16.5)
HMPV RNA NPH QL NAA+NON-PROBE: NOT DETECTED
HPIV1 RNA NPH QL NAA+NON-PROBE: NOT DETECTED
HPIV2 RNA NPH QL NAA+NON-PROBE: NOT DETECTED
HPIV3 RNA NPH QL NAA+NON-PROBE: NOT DETECTED
HPIV4 RNA NPH QL NAA+NON-PROBE: NOT DETECTED
IMM GRANULOCYTES # BLD: 0.05 E9/L
IMM GRANULOCYTES NFR BLD: 0.4 % (ref 0–5)
INR BLD: 1.6
LACTATE BLDV-SCNC: 1.5 MMOL/L (ref 0.5–2.2)
LYMPHOCYTES # BLD: 0.55 E9/L (ref 1.5–4)
LYMPHOCYTES NFR BLD: 4.1 % (ref 20–42)
M PNEUMO DNA NPH QL NAA+NON-PROBE: NOT DETECTED
MCH RBC QN AUTO: 31.7 PG (ref 26–35)
MCHC RBC AUTO-ENTMCNC: 33.8 % (ref 32–34.5)
MCV RBC AUTO: 93.8 FL (ref 80–99.9)
MONOCYTES # BLD: 0.88 E9/L (ref 0.1–0.95)
MONOCYTES NFR BLD: 6.5 % (ref 2–12)
NEUTROPHILS # BLD: 11.97 E9/L (ref 1.8–7.3)
NEUTS SEG NFR BLD: 88.8 % (ref 43–80)
PLATELET # BLD AUTO: 299 E9/L (ref 130–450)
PMV BLD AUTO: 8.6 FL (ref 7–12)
POTASSIUM SERPL-SCNC: 4.3 MMOL/L (ref 3.5–5)
PROT SERPL-MCNC: 7.4 G/DL (ref 6.4–8.3)
PROTHROMBIN TIME: 17.7 SEC (ref 9.3–12.4)
RBC # BLD AUTO: 4.19 E12/L (ref 3.8–5.8)
RBC MORPH BLD: NORMAL
RSV RNA NPH QL NAA+NON-PROBE: NOT DETECTED
RV+EV RNA NPH QL NAA+NON-PROBE: DETECTED
SARS-COV-2 RDRP RESP QL NAA+PROBE: NOT DETECTED
SARS-COV-2 RNA NPH QL NAA+NON-PROBE: NOT DETECTED
SODIUM SERPL-SCNC: 132 MMOL/L (ref 132–146)
TROPONIN, HIGH SENSITIVITY: 17 NG/L (ref 0–11)
TROPONIN, HIGH SENSITIVITY: 18 NG/L (ref 0–11)
TSH SERPL-MCNC: 2.11 UIU/ML (ref 0.27–4.2)
WBC # BLD: 13.5 E9/L (ref 4.5–11.5)

## 2023-05-14 PROCEDURE — 87635 SARS-COV-2 COVID-19 AMP PRB: CPT

## 2023-05-14 PROCEDURE — 83880 ASSAY OF NATRIURETIC PEPTIDE: CPT

## 2023-05-14 PROCEDURE — 87040 BLOOD CULTURE FOR BACTERIA: CPT

## 2023-05-14 PROCEDURE — 84484 ASSAY OF TROPONIN QUANT: CPT

## 2023-05-14 PROCEDURE — 6370000000 HC RX 637 (ALT 250 FOR IP): Performed by: EMERGENCY MEDICINE

## 2023-05-14 PROCEDURE — 80053 COMPREHEN METABOLIC PANEL: CPT

## 2023-05-14 PROCEDURE — 6360000002 HC RX W HCPCS: Performed by: EMERGENCY MEDICINE

## 2023-05-14 PROCEDURE — 94640 AIRWAY INHALATION TREATMENT: CPT

## 2023-05-14 PROCEDURE — 83605 ASSAY OF LACTIC ACID: CPT

## 2023-05-14 PROCEDURE — 96374 THER/PROPH/DIAG INJ IV PUSH: CPT

## 2023-05-14 PROCEDURE — 84443 ASSAY THYROID STIM HORMONE: CPT

## 2023-05-14 PROCEDURE — 85025 COMPLETE CBC W/AUTO DIFF WBC: CPT

## 2023-05-14 PROCEDURE — 99285 EMERGENCY DEPT VISIT HI MDM: CPT

## 2023-05-14 PROCEDURE — 0202U NFCT DS 22 TRGT SARS-COV-2: CPT

## 2023-05-14 PROCEDURE — 6360000004 HC RX CONTRAST MEDICATION: Performed by: RADIOLOGY

## 2023-05-14 PROCEDURE — 71275 CT ANGIOGRAPHY CHEST: CPT

## 2023-05-14 PROCEDURE — 85610 PROTHROMBIN TIME: CPT

## 2023-05-14 PROCEDURE — 93005 ELECTROCARDIOGRAM TRACING: CPT | Performed by: EMERGENCY MEDICINE

## 2023-05-14 RX ORDER — ACETAMINOPHEN 500 MG
1000 TABLET ORAL ONCE
Status: COMPLETED | OUTPATIENT
Start: 2023-05-14 | End: 2023-05-14

## 2023-05-14 RX ORDER — DEXAMETHASONE SODIUM PHOSPHATE 10 MG/ML
10 INJECTION INTRAMUSCULAR; INTRAVENOUS ONCE
Status: COMPLETED | OUTPATIENT
Start: 2023-05-14 | End: 2023-05-14

## 2023-05-14 RX ORDER — DOXYCYCLINE HYCLATE 100 MG
100 TABLET ORAL 2 TIMES DAILY
Qty: 14 TABLET | Refills: 0 | Status: SHIPPED | OUTPATIENT
Start: 2023-05-14 | End: 2023-05-21

## 2023-05-14 RX ORDER — CEFDINIR 300 MG/1
300 CAPSULE ORAL 2 TIMES DAILY
Qty: 14 CAPSULE | Refills: 0 | Status: SHIPPED | OUTPATIENT
Start: 2023-05-14 | End: 2023-05-21

## 2023-05-14 RX ORDER — IPRATROPIUM BROMIDE AND ALBUTEROL SULFATE 2.5; .5 MG/3ML; MG/3ML
1 SOLUTION RESPIRATORY (INHALATION)
Status: COMPLETED | OUTPATIENT
Start: 2023-05-14 | End: 2023-05-14

## 2023-05-14 RX ADMIN — IPRATROPIUM BROMIDE AND ALBUTEROL SULFATE 1 AMPULE: .5; 3 SOLUTION RESPIRATORY (INHALATION) at 14:18

## 2023-05-14 RX ADMIN — IPRATROPIUM BROMIDE AND ALBUTEROL SULFATE 1 AMPULE: .5; 3 SOLUTION RESPIRATORY (INHALATION) at 14:22

## 2023-05-14 RX ADMIN — ACETAMINOPHEN 1000 MG: 500 TABLET ORAL at 13:37

## 2023-05-14 RX ADMIN — IOPAMIDOL 75 ML: 755 INJECTION, SOLUTION INTRAVENOUS at 15:00

## 2023-05-14 RX ADMIN — IPRATROPIUM BROMIDE AND ALBUTEROL SULFATE 1 AMPULE: .5; 3 SOLUTION RESPIRATORY (INHALATION) at 14:21

## 2023-05-14 RX ADMIN — DEXAMETHASONE SODIUM PHOSPHATE 10 MG: 10 INJECTION INTRAMUSCULAR; INTRAVENOUS at 13:01

## 2023-05-14 ASSESSMENT — ENCOUNTER SYMPTOMS
EYE REDNESS: 0
NAUSEA: 0
BACK PAIN: 0
COUGH: 1
ABDOMINAL PAIN: 0
VOMITING: 0
WHEEZING: 1

## 2023-05-15 LAB
EKG ATRIAL RATE: 71 BPM
EKG P AXIS: 70 DEGREES
EKG P-R INTERVAL: 162 MS
EKG Q-T INTERVAL: 422 MS
EKG QRS DURATION: 152 MS
EKG QTC CALCULATION (BAZETT): 458 MS
EKG R AXIS: 29 DEGREES
EKG T AXIS: 113 DEGREES
EKG VENTRICULAR RATE: 71 BPM

## 2023-05-15 PROCEDURE — 93010 ELECTROCARDIOGRAM REPORT: CPT | Performed by: INTERNAL MEDICINE

## 2023-05-19 LAB
BACTERIA BLD CULT ORG #2: NORMAL
BACTERIA BLD CULT: NORMAL

## 2024-05-22 ENCOUNTER — OFFICE VISIT (OUTPATIENT)
Dept: SURGERY | Age: 79
End: 2024-05-22
Payer: MEDICARE

## 2024-05-22 VITALS
TEMPERATURE: 98.6 F | OXYGEN SATURATION: 96 % | BODY MASS INDEX: 20.86 KG/M2 | DIASTOLIC BLOOD PRESSURE: 70 MMHG | WEIGHT: 154 LBS | HEIGHT: 72 IN | HEART RATE: 65 BPM | SYSTOLIC BLOOD PRESSURE: 130 MMHG | RESPIRATION RATE: 20 BRPM

## 2024-05-22 DIAGNOSIS — C44.320 SQUAMOUS CELL CARCINOMA OF FACE: Primary | ICD-10-CM

## 2024-05-22 PROCEDURE — 1123F ACP DISCUSS/DSCN MKR DOCD: CPT | Performed by: PLASTIC SURGERY

## 2024-05-22 PROCEDURE — 99203 OFFICE O/P NEW LOW 30 MIN: CPT | Performed by: PLASTIC SURGERY

## 2024-05-22 NOTE — PROGRESS NOTES
Department of Plastic Surgery - Adult  Attending Consult Note      CHIEF COMPLAINT:   Squamous Cell Cancer of right leg    History Obtained From:  patient    HISTORY OF PRESENT ILLNESS:                The patient is a 78 y.o. male who presents with biopsy proven squamous cell carcinoma of the right popliteal fossa.  The patient states that they first noticed the lesion several months ago.  It has  grown in size since they first noticed the lesion.  The lesion has not changed in color and has not had discharge or bleeding.  The pt has had the lesion biopsied previously.  The patient has not had the lesion removed previously. The patient states the lesion is at times painfull.  The pt denies any associated symptoms.      Past Medical History:    Past Medical History:   Diagnosis Date    Known health problems: none     none per pt    Sleep apnea     BI-PAP     Past Surgical History:    Past Surgical History:   Procedure Laterality Date    ARM SURGERY Right 12/21/2021    EXC OF SCC RIGHT FORSAL HAND WITH FULL THICKNES SKIN GRAFT performed by Thierno Payan MD at [facility] OR    HERNIA REPAIR      abdominal in 2003 or 2004 per pt    LEG SURGERY      right leg scc       Current Medications:       Current Outpatient Medications   Medication Sig Dispense Refill    Cholecalciferol (VITAMIN D) 50 MCG (2000 UT) CAPS capsule Take by mouth      Ascorbic Acid (VITAMIN C) 250 MG tablet Take 1 tablet by mouth daily      Zinc 100 MG TABS Take by mouth      b complex vitamins capsule Take 1 capsule by mouth daily      bacitracin 500 UNIT/GM ophthalmic ointment Place over eyelid incisions twice daily (Patient not taking: Reported on 5/22/2024) 1 each 0     No current facility-administered medications for this visit.      Allergies:  Patient has no known allergies.    Social History:   Social History     Socioeconomic History    Marital status:      Spouse name: Not on file    Number of children: Not on file    Years of education:

## 2024-05-28 ENCOUNTER — TELEPHONE (OUTPATIENT)
Dept: SURGERY | Age: 79
End: 2024-05-28

## 2024-05-28 NOTE — TELEPHONE ENCOUNTER
Surgery has been scheduled at [facility] [address] [place], [place] on 6/11/24, Pre-Admission Testing will call you prior to surgery to inform you arrival time and any other additional directions,if they are unable to reach you,please call them two days prior at [phone].  If taking Fish Oil, Vitamins, two weeks prior to surgery stop taking. If taking NSAIDS (such as Aspirin, Ibuprofen) anticoagulants please consult with your prescribing physician to get further instructions on when to stop medication prior to surgery that is scheduled, patient understood.    Pre-Auth #: NOT REQUIRED  CPT Codes: 59128, 74388, 21325  ICD 10: C44.320      Phone discussion was had with the patient today regarding upcoming surgery.  I had a discussion with the patient that although the patient's insurance company has approved the procedure proposed, there is no guarantee of payment by the insurance company on their final review following the procedure. This is also reflected in the letter received by this office and the patient from the insurance company.    The patient has voiced to me complete understanding of this scenario, understands that they will be responsible for their individual deductable/coinsurance balance as an out-of-pocket expense and possibly the financial responsibility of the surgical procedure if the patient's insurance company elects not to pay for certain or all services.    The patient elects to proceed with the proposed surgical plan.

## 2024-05-29 ENCOUNTER — PREP FOR PROCEDURE (OUTPATIENT)
Dept: SURGERY | Age: 79
End: 2024-05-29

## 2024-05-29 DIAGNOSIS — C44.320 SQUAMOUS CELL CARCINOMA, FACE: ICD-10-CM

## 2024-06-06 RX ORDER — FLUOROURACIL 5 MG/G
CREAM TOPICAL
COMMUNITY
Start: 2020-10-05

## 2024-06-06 NOTE — PROGRESS NOTES
[facility]   PRE-ADMISSION TESTING GENERAL INSTRUCTIONS  PAT Phone Number: [phone]      GENERAL INSTRUCTIONS:    [x] Antibacterial Soap Shower Night before and/or AM of surgery.  [] CHG Wipes instruction sheet and wipes given.  [x] Do not wear makeup, lotions, powders, deodorant the morning of surgery.  [x] Nothing to eat or drink after midnight. This includes no gum, candy, mints or water.  [x] You may brush your teeth, gargle, but do not swallow water.   [x] No tobacco products, illegal drugs, or alcohol within 24 hours of your surgery.  [x] Jewelry or valuables should not be brought to the hospital. All body and/or tongue piercing's must be removed prior to arriving to hospital. No contact lens or hair pins.   [x] Arrange transportation with a responsible adult  to and from the hospital. Arrange for someone to be with you for the remainder of the day and for 24 hours after your procedure due to having had anesthesia.          -Who will be your  for transportation? Dahiana        -Who will be staying with you for 24 hrs after your procedure? Dahiana  [x] Bring insurance card and photo ID.  [x] Bring copy of living will or healthcare power of  papers to be placed in your electronic record.  [] Urine Pregnancy test will be preformed the day of surgery. A specimen sample may be brought from home.  [] Transfusion (Green) Bracelet: Please bring with you to hospital, day of surgery.     PARKING INSTRUCTIONS:     [x] ARRIVAL DATE & TIME: 6/11 @ 0630am  [x] Times are subject to change. We will contact you the business day before surgery if that were to occur.  [x] Enter into the [facility] Entrance. Two people may accompany you. Masks are not required.  [x] Parking Lot \"I\" is where you will park. It is located on the corner of [facility] and [address]. The entrance is on [address].   Only one vehicle - per patient, is permitted in parking lot.  appreciated as you wait for your nurse.   [x] Delays may occur with surgery and staff will make a sincere effort to keep you informed of delays. If any delays occur with your procedure, we apologize ahead of time for your inconvenience as we recognize the value of your time.

## 2024-06-10 NOTE — H&P
Department of Plastic Surgery - Adult  Attending Consult Note        CHIEF COMPLAINT:   Squamous Cell Cancer of right leg     History Obtained From:  patient     HISTORY OF PRESENT ILLNESS:                 The patient is a 78 y.o. male who presents with biopsy proven squamous cell carcinoma of the right popliteal fossa.  The patient states that they first noticed the lesion several months ago.  It has  grown in size since they first noticed the lesion.  The lesion has not changed in color and has not had discharge or bleeding.  The pt has had the lesion biopsied previously.  The patient has not had the lesion removed previously. The patient states the lesion is at times painfull.  The pt denies any associated symptoms.       Past Medical History:    Past Medical History        Past Medical History:   Diagnosis Date    Known health problems: none       none per pt    Sleep apnea       BI-PAP         Past Surgical History:    Past Surgical History         Past Surgical History:   Procedure Laterality Date    ARM SURGERY Right 12/21/2021     EXC OF SCC RIGHT FORSAL HAND WITH FULL THICKNES SKIN GRAFT performed by Thierno Payan MD at [facility] [place]    HERNIA REPAIR         abdominal in 2003 or 2004 per pt    LEG SURGERY         right leg scc           Current Medications:       Current Facility-Administered Medications          Current Outpatient Medications   Medication Sig Dispense Refill    Cholecalciferol (VITAMIN D) 50 MCG (2000 UT) CAPS capsule Take by mouth        Ascorbic Acid (VITAMIN C) 250 MG tablet Take 1 tablet by mouth daily        Zinc 100 MG TABS Take by mouth        b complex vitamins capsule Take 1 capsule by mouth daily        bacitracin 500 UNIT/GM ophthalmic ointment Place over eyelid incisions twice daily (Patient not taking: Reported on 5/22/2024) 1 each 0      No current facility-administered medications for this visit.          Allergies:  Patient has no known allergies.     Social History:   Social  the chief complaint and history of present illness including (ROS and PFSH) and vital documentation by my staff and I agree with their documentation and have added when applicable.        PHYSICAL EXAM:    VITALS:  /70 (Site: Left Upper Arm, Position: Sitting, Cuff Size: Medium Adult)   Pulse 65   Temp 98.6 °F (37 °C) (Infrared)   Resp 20   Ht 1.829 m (6')   Wt 69.9 kg (154 lb)   SpO2 96%   BMI 20.89 kg/m²   CONSTITUTIONAL:  awake, alert, cooperative, no apparent distress, and appears stated age  EYES: PERRLA, EOMI, no signs of occular infection  LUNGS:  No increased work of breathing, good air exchange, clear to auscultation bilaterally, no crackles or wheezing  CARDIOVASCULAR:  Normal apical impulse, regular rate and rhythm  EXTREMITIES: no signs of clubbing or cyanosis.  MUSCULOSKELETAL: negative for flaccid muscle tone or spastic movements.  NEURO: Cranial nerves II-XII grossly intact. No signs of agitated mood.      SKIN: Biopsy proven squamous cell carcinoma of right popliteal fossa-  20mm x 20mm,  skin tone in color, irregular border, raised, no signs of bleeding,drainage or infection. Non tender to palpation.         DATA:    Labs: CBC:         Lab Results   Component Value Date/Time     WBC 13.5 05/14/2023 12:33 PM     RBC 4.19 05/14/2023 12:33 PM     HGB 13.3 05/14/2023 12:33 PM     HCT 39.3 05/14/2023 12:33 PM     MCV 93.8 05/14/2023 12:33 PM     MCH 31.7 05/14/2023 12:33 PM     MCHC 33.8 05/14/2023 12:33 PM     RDW 12.8 05/14/2023 12:33 PM      05/14/2023 12:33 PM     MPV 8.6 05/14/2023 12:33 PM      BMP:          Lab Results   Component Value Date/Time      05/14/2023 12:33 PM     K 4.3 05/14/2023 12:33 PM     CL 94 05/14/2023 12:33 PM     CO2 25 05/14/2023 12:33 PM     BUN 15 05/14/2023 12:33 PM     CREATININE 1.0 05/14/2023 12:33 PM     CALCIUM 9.1 05/14/2023 12:33 PM     LABGLOM >60 05/14/2023 12:33 PM     GLUCOSE 114 05/14/2023 12:33 PM         Radiology Review:  No

## 2024-06-11 ENCOUNTER — HOSPITAL ENCOUNTER (OUTPATIENT)
Age: 79
Setting detail: OUTPATIENT SURGERY
Discharge: HOME | End: 2024-06-11
Attending: PLASTIC SURGERY | Admitting: PLASTIC SURGERY
Payer: MEDICARE

## 2024-06-11 ENCOUNTER — ANESTHESIA EVENT (OUTPATIENT)
Dept: OPERATING ROOM | Age: 79
End: 2024-06-11
Payer: MEDICARE

## 2024-06-11 ENCOUNTER — ANESTHESIA (OUTPATIENT)
Dept: OPERATING ROOM | Age: 79
End: 2024-06-11
Payer: MEDICARE

## 2024-06-11 VITALS
TEMPERATURE: 98 F | OXYGEN SATURATION: 95 % | HEART RATE: 64 BPM | BODY MASS INDEX: 20.99 KG/M2 | HEIGHT: 72 IN | WEIGHT: 155 LBS | SYSTOLIC BLOOD PRESSURE: 133 MMHG | RESPIRATION RATE: 20 BRPM | DIASTOLIC BLOOD PRESSURE: 72 MMHG

## 2024-06-11 DIAGNOSIS — G89.18 POST-OP PAIN: Primary | ICD-10-CM

## 2024-06-11 DIAGNOSIS — C44.320 SQUAMOUS CELL CARCINOMA, FACE: ICD-10-CM

## 2024-06-11 PROCEDURE — 3600000012 HC SURGERY LEVEL 2 ADDTL 15MIN: Performed by: PLASTIC SURGERY

## 2024-06-11 PROCEDURE — 6360000002 HC RX W HCPCS

## 2024-06-11 PROCEDURE — 15221 FTH/GFT FR S/A/L EACH ADDL: CPT | Performed by: PLASTIC SURGERY

## 2024-06-11 PROCEDURE — 3600000002 HC SURGERY LEVEL 2 BASE: Performed by: PLASTIC SURGERY

## 2024-06-11 PROCEDURE — 7100000011 HC PHASE II RECOVERY - ADDTL 15 MIN: Performed by: PLASTIC SURGERY

## 2024-06-11 PROCEDURE — 88305 TISSUE EXAM BY PATHOLOGIST: CPT

## 2024-06-11 PROCEDURE — 11606 EXC TR-EXT MAL+MARG >4 CM: CPT | Performed by: PLASTIC SURGERY

## 2024-06-11 PROCEDURE — 3700000001 HC ADD 15 MINUTES (ANESTHESIA): Performed by: PLASTIC SURGERY

## 2024-06-11 PROCEDURE — 3700000000 HC ANESTHESIA ATTENDED CARE: Performed by: PLASTIC SURGERY

## 2024-06-11 PROCEDURE — 2709999900 HC NON-CHARGEABLE SUPPLY: Performed by: PLASTIC SURGERY

## 2024-06-11 PROCEDURE — 2500000003 HC RX 250 WO HCPCS: Performed by: PLASTIC SURGERY

## 2024-06-11 PROCEDURE — 2580000003 HC RX 258

## 2024-06-11 PROCEDURE — 6360000002 HC RX W HCPCS: Performed by: PHYSICIAN ASSISTANT

## 2024-06-11 PROCEDURE — 7100000010 HC PHASE II RECOVERY - FIRST 15 MIN: Performed by: PLASTIC SURGERY

## 2024-06-11 PROCEDURE — 2580000003 HC RX 258: Performed by: PHYSICIAN ASSISTANT

## 2024-06-11 PROCEDURE — 15220 FTH/GFT FR S/A/L 20 SQ CM/<: CPT | Performed by: PLASTIC SURGERY

## 2024-06-11 RX ORDER — HYDROCODONE BITARTRATE AND ACETAMINOPHEN 5; 325 MG/1; MG/1
1 TABLET ORAL EVERY 6 HOURS PRN
Qty: 12 TABLET | Refills: 0 | Status: SHIPPED | OUTPATIENT
Start: 2024-06-11 | End: 2024-06-14

## 2024-06-11 RX ORDER — ONDANSETRON 4 MG/1
4 TABLET, ORALLY DISINTEGRATING ORAL 3 TIMES DAILY PRN
Qty: 9 TABLET | Refills: 0 | Status: SHIPPED | OUTPATIENT
Start: 2024-06-11 | End: 2024-06-14

## 2024-06-11 RX ORDER — BACITRACIN ZINC AND POLYMYXIN B SULFATE 500; 1000 [USP'U]/G; [USP'U]/G
OINTMENT TOPICAL
Qty: 15 G | Refills: 1 | Status: SHIPPED | OUTPATIENT
Start: 2024-06-11 | End: 2024-06-18

## 2024-06-11 RX ORDER — LIDOCAINE HYDROCHLORIDE AND EPINEPHRINE 10; 10 MG/ML; UG/ML
INJECTION, SOLUTION INFILTRATION; PERINEURAL PRN
Status: DISCONTINUED | OUTPATIENT
Start: 2024-06-11 | End: 2024-06-11 | Stop reason: HOSPADM

## 2024-06-11 RX ORDER — PHENYLEPHRINE HCL IN 0.9% NACL 1 MG/10 ML
SYRINGE (ML) INTRAVENOUS PRN
Status: DISCONTINUED | OUTPATIENT
Start: 2024-06-11 | End: 2024-06-11 | Stop reason: SDUPTHER

## 2024-06-11 RX ORDER — CEPHALEXIN 500 MG/1
500 CAPSULE ORAL 2 TIMES DAILY
Qty: 14 CAPSULE | Refills: 0 | Status: SHIPPED | OUTPATIENT
Start: 2024-06-11 | End: 2024-06-18

## 2024-06-11 RX ORDER — GLYCOPYRROLATE 0.2 MG/ML
INJECTION INTRAMUSCULAR; INTRAVENOUS PRN
Status: DISCONTINUED | OUTPATIENT
Start: 2024-06-11 | End: 2024-06-11 | Stop reason: SDUPTHER

## 2024-06-11 RX ORDER — SODIUM CHLORIDE 9 MG/ML
INJECTION, SOLUTION INTRAVENOUS CONTINUOUS
Status: DISCONTINUED | OUTPATIENT
Start: 2024-06-11 | End: 2024-06-11 | Stop reason: HOSPADM

## 2024-06-11 RX ORDER — LIDOCAINE HYDROCHLORIDE 20 MG/ML
INJECTION, SOLUTION INTRAVENOUS PRN
Status: DISCONTINUED | OUTPATIENT
Start: 2024-06-11 | End: 2024-06-11 | Stop reason: SDUPTHER

## 2024-06-11 RX ORDER — SODIUM CHLORIDE 0.9 % (FLUSH) 0.9 %
5-40 SYRINGE (ML) INJECTION PRN
Status: DISCONTINUED | OUTPATIENT
Start: 2024-06-11 | End: 2024-06-11 | Stop reason: HOSPADM

## 2024-06-11 RX ORDER — SODIUM CHLORIDE 9 MG/ML
INJECTION, SOLUTION INTRAVENOUS PRN
Status: DISCONTINUED | OUTPATIENT
Start: 2024-06-11 | End: 2024-06-11 | Stop reason: HOSPADM

## 2024-06-11 RX ORDER — SODIUM CHLORIDE 0.9 % (FLUSH) 0.9 %
5-40 SYRINGE (ML) INJECTION EVERY 12 HOURS SCHEDULED
Status: DISCONTINUED | OUTPATIENT
Start: 2024-06-11 | End: 2024-06-11 | Stop reason: HOSPADM

## 2024-06-11 RX ORDER — SODIUM CHLORIDE 9 MG/ML
INJECTION, SOLUTION INTRAVENOUS CONTINUOUS PRN
Status: DISCONTINUED | OUTPATIENT
Start: 2024-06-11 | End: 2024-06-11 | Stop reason: SDUPTHER

## 2024-06-11 RX ORDER — FENTANYL CITRATE 50 UG/ML
INJECTION, SOLUTION INTRAMUSCULAR; INTRAVENOUS PRN
Status: DISCONTINUED | OUTPATIENT
Start: 2024-06-11 | End: 2024-06-11 | Stop reason: SDUPTHER

## 2024-06-11 RX ORDER — PROPOFOL 10 MG/ML
INJECTION, EMULSION INTRAVENOUS PRN
Status: DISCONTINUED | OUTPATIENT
Start: 2024-06-11 | End: 2024-06-11 | Stop reason: SDUPTHER

## 2024-06-11 RX ADMIN — SODIUM CHLORIDE: 9 INJECTION, SOLUTION INTRAVENOUS at 07:18

## 2024-06-11 RX ADMIN — Medication 50 MCG: at 09:51

## 2024-06-11 RX ADMIN — CEFAZOLIN 2000 MG: 2 INJECTION, POWDER, FOR SOLUTION INTRAMUSCULAR; INTRAVENOUS at 09:20

## 2024-06-11 RX ADMIN — PROPOFOL 100 MCG/KG/MIN: 10 INJECTION, EMULSION INTRAVENOUS at 09:18

## 2024-06-11 RX ADMIN — SODIUM CHLORIDE: 9 INJECTION, SOLUTION INTRAVENOUS at 09:11

## 2024-06-11 RX ADMIN — LIDOCAINE HYDROCHLORIDE 60 MG: 20 INJECTION, SOLUTION INTRAVENOUS at 09:17

## 2024-06-11 RX ADMIN — PROPOFOL 30 MG: 10 INJECTION, EMULSION INTRAVENOUS at 09:17

## 2024-06-11 RX ADMIN — Medication 100 MCG: at 09:57

## 2024-06-11 RX ADMIN — GLYCOPYRROLATE 0.2 MG: 0.2 INJECTION INTRAMUSCULAR; INTRAVENOUS at 09:35

## 2024-06-11 RX ADMIN — FENTANYL CITRATE 25 MCG: 50 INJECTION, SOLUTION INTRAMUSCULAR; INTRAVENOUS at 09:26

## 2024-06-11 RX ADMIN — FENTANYL CITRATE 25 MCG: 50 INJECTION, SOLUTION INTRAMUSCULAR; INTRAVENOUS at 09:19

## 2024-06-11 ASSESSMENT — PAIN - FUNCTIONAL ASSESSMENT
PAIN_FUNCTIONAL_ASSESSMENT: 0-10
PAIN_FUNCTIONAL_ASSESSMENT: NONE - DENIES PAIN

## 2024-06-11 ASSESSMENT — LIFESTYLE VARIABLES: SMOKING_STATUS: 1

## 2024-06-11 NOTE — PROGRESS NOTES
CLINICAL PHARMACY NOTE: MEDS TO BEDS    Total # of Prescriptions Filled: 4   The following medications were delivered to the patient:  Cehpalexin 500  Double antibiotic   Ondansetron 4mg odt  Hydrocodone-acetaminophen 5-325    Additional Documentation:  Dahiana picked up in pharmacy

## 2024-06-11 NOTE — DISCHARGE INSTRUCTIONS
Discharge Home.   Call office to schedule a follow-up appointment at [phone]  Please call to schedule an appointment to be seen In our office Thursday 6-20-24  Diet: regular diet  Activity: ambulate in house and no Strenuous exercise for 1 week  Shower/Bathing: OK to shower at this time below the level of the neck.  No baths, hot tubs or soaking of the wound site at this time.   Dressings /Splint /Wound Care:  Apply bacitracin three times daily to the wound site bolster dressing ( yellow dressing sutured into place ).  The bolster dressing will remain sutured into place over the skin graft until seen in our office.  There is a dressing over the skin graft donor site.  This dressing can remain covered until seen in our office.  Driving: It is OK to drive if the following criteria are met:   1- Not taking narcotic pain medication   2- Not distracted by pain or limited ROM   3- Not a hazard to self or others on the road  Medications: As prescribed.  Educated to contact physician at [phone] with concerns or signs of infection (redness, increasing pain, discharge, odor, fever).

## 2024-06-11 NOTE — ANESTHESIA POSTPROCEDURE EVALUATION
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 with Full Thickness Skin Graft (Right: Knee) Diagnosis:       Squamous cell carcinoma, face      (Squamous cell carcinoma, face [C44.320])    Surgeons: Thierno Payan MD Responsible Provider: Rahul Wills MD    Anesthesia Type: MAC ASA Status: 2            Anesthesia Type: MAC    Malu Phase I: Malu Score: 10    Malu Phase II: Malu Score: 10    Anesthesia Post Evaluation    Patient location during evaluation: PACU  Patient participation: complete - patient participated  Level of consciousness: awake  Pain score: 3  Airway patency: patent  Nausea & Vomiting: no nausea and no vomiting  Cardiovascular status: blood pressure returned to baseline  Respiratory status: acceptable  Hydration status: euvolemic    No notable events documented.

## 2024-06-11 NOTE — ANESTHESIA PRE PROCEDURE
Axis  degrees 70   R Axis  degrees 29   T Axis  degrees 113   Resulting Agency [facility]              Narrative & Impression    Normal sinus rhythm  Left bundle branch block  Abnormal ECG               Anesthesia Evaluation  Patient summary reviewed and Nursing notes reviewed   no history of anesthetic complications:   Airway: Mallampati: III  TM distance: >3 FB   Neck ROM: full  Mouth opening: < 3 FB  C-spine cleared Dental:    (+) bridge      Pulmonary: breath sounds clear to auscultation  (+)     sleep apnea: on CPAP,       current smoker          Patient did not smoke on day of surgery.                 Cardiovascular:  Exercise tolerance: good (>4 METS)        ECG reviewed  Rhythm: regular  Rate: normal           Beta Blocker:  Not on Beta Blocker         Neuro/Psych:               GI/Hepatic/Renal:             Endo/Other:                C-spine cleared           Abdominal:             Vascular:          Other Findings:         Anesthesia Plan      MAC     ASA 2     (#20 R AC)  Induction: intravenous.      Anesthetic plan and risks discussed with patient.    Use of blood products discussed with patient whom consented to blood products.    Plan discussed with attending and CAA.                  Aaron Pennington, OCHOA   6/11/2024

## 2024-06-11 NOTE — H&P
Kyler Dean was seen and re-examined preoperatively today, June 11, 2024.  There was no substantial change in his physical and medical status. All Meds and Family/Social/Previous history was reviewed and there were no significant changes. Patient is fit for the proposed surgical procedure.  All questions were appropriately addressed and had no further questions regarding the risks, benefits, and alternatives of the procedure.  Kyler Dean and family wished to proceed.    Venkata Sim DO  Resident Physician  [facility]  Otolaryngology Residency  6/11/2024  10:25 AM

## 2024-06-11 NOTE — OP NOTE
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    PROCEDURES:  Excision of squamous cell carcinoma of the right popliteal fossa with full-thickness skin graft closure, lesion measures 52 x 32 mm, margins were 4 mm, defect measured 60 x 40 mm, and full-thickness skin graft measured 60 x 40 mm.    ASSISTANT:  Resident, Venkata Sim D.O.    ANESTHESIA:  Monitored anesthesia care.    ESTIMATED BLOOD LOSS:  10 mL.    IV FLUIDS GIVEN:  600 mL of crystalloid.    COMPLICATIONS:  None.    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    INDICATIONS FOR PROCEDURE:  This is a 78-year-old male with biopsy-proven squamous cell carcinoma of the right popliteal fossa.  Recommendation was for wide local excision with possible full-thickness skin graft closure.  Risks, benefits, and alternatives were thoroughly discussed with the patient including but not limited to risk of bleeding, infection, scarring, damage to surrounding structures, fluid collection, asymmetry, need for further procedures.  He verbalized understanding and agreed to proceed.    DESCRIPTION OF PROCEDURE:  The patient was identified preoperatively, brought back to the operating room, and placed in the left lateral decubitus position, was turned over to Anesthesia for proper induction via monitored anesthesia care.  Perioperative antibiotics were given.  SCDs were on and functioning.  A  the procedure.  The patient was turned back over to Anesthesia for proper awakening, taken to PACU in good condition without any complications.  All sponge and instrument counts were correct x2, and Dr. Payan was present and scrubbed for the entirety of the procedure.          AMARI BUSTOS DO      D:  06/11/2024 13:09:37     T:  06/11/2024 13:37:34     DEVORA/WILLA  Job #:  [id number]     Doc#:  [id number]

## 2024-06-19 LAB — SURGICAL PATHOLOGY REPORT: NORMAL

## 2024-06-20 ENCOUNTER — OFFICE VISIT (OUTPATIENT)
Dept: SURGERY | Age: 79
End: 2024-06-20

## 2024-06-20 VITALS — TEMPERATURE: 97.7 F

## 2024-06-20 DIAGNOSIS — C44.320 SQUAMOUS CELL CARCINOMA, FACE: Primary | ICD-10-CM

## 2024-06-20 PROCEDURE — 99024 POSTOP FOLLOW-UP VISIT: CPT | Performed by: PHYSICIAN ASSISTANT

## 2024-06-20 NOTE — PROGRESS NOTES
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 Denies fever, nausea, vomiting, leg pain or swelling, pain is absent.  The pt states that they have  kept the skin graft covered with bolster dressings sutured in place as well as Steri-Strips and Tegaderm to their donor site.   They state that their pain is absent. The patient states that they have  finished their antibiotic.     Objective:    Temp 97.7 °F (36.5 °C) (Temporal)       Donor Wound: Clean, and intact.  No signs of infection, wound healing well    FTSG Wound Site : Bolster dressing intact , approximately 90% of the FTSG intact     Neuro- Sensation  intact to antony wound sites with light touch     Assessment:    Patient Active Problem List   Diagnosis    COVID-19    Squamous cell carcinoma, face       Labs: CBC:   Lab Results   Component Value Date/Time    WBC 13.5 05/14/2023 12:33 PM    RBC 4.19 05/14/2023 12:33 PM    HGB 13.3 05/14/2023 12:33 PM    HCT 39.3 05/14/2023 12:33 PM    MCV 93.8 05/14/2023 12:33 PM    MCH 31.7 05/14/2023 12:33 PM    MCHC 33.8 05/14/2023 12:33 PM    RDW 12.8 05/14/2023 12:33 PM     05/14/2023 12:33 PM    MPV 8.6 05/14/2023 12:33 PM     BMP:    Lab Results   Component Value Date/Time     05/14/2023 12:33 PM    K 4.3 05/14/2023 12:33 PM    CL 94 05/14/2023 12:33 PM    CO2 25 05/14/2023 12:33 PM    BUN 15 05/14/2023 12:33 PM    CREATININE 1.0 05/14/2023 12:33 PM    CALCIUM 9.1 05/14/2023 12:33 PM    LABGLOM >60 05/14/2023 12:33 PM    GLUCOSE 114 05/14/2023 12:33 PM         Pathology Report-    Pathology Report Path Number: [id number]    -- Diagnosis --  Skin right popliteal fossa, excision: Actinic keratosis and chronic  inflammation.  Negative for neoplasm.         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

## 2024-07-01 NOTE — PROGRESS NOTES
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 Denies fever, nausea, vomiting, leg pain or swelling, pain is absent.  The pt states that they have  kept the skin graft covered with bolster dressings sutured in place as well as Steri-Strips and Tegaderm to their donor site.   They state that their pain is absent. The patient states that they have  finished their antibiotic.     Objective:    There were no vitals taken for this visit.      Donor Wound: Clean, and intact.  No signs of infection, wound healing well    Right leg FTSG approximately 30% of the FTSG intact     Neuro- Sensation  intact to antony wound sites with light touch     Assessment:    Patient Active Problem List   Diagnosis    COVID-19    Squamous cell carcinoma, face       Labs: CBC:   Lab Results   Component Value Date/Time    WBC 13.5 05/14/2023 12:33 PM    RBC 4.19 05/14/2023 12:33 PM    HGB 13.3 05/14/2023 12:33 PM    HCT 39.3 05/14/2023 12:33 PM    MCV 93.8 05/14/2023 12:33 PM    MCH 31.7 05/14/2023 12:33 PM    MCHC 33.8 05/14/2023 12:33 PM    RDW 12.8 05/14/2023 12:33 PM     05/14/2023 12:33 PM    MPV 8.6 05/14/2023 12:33 PM     BMP:    Lab Results   Component Value Date/Time     05/14/2023 12:33 PM    K 4.3 05/14/2023 12:33 PM    CL 94 05/14/2023 12:33 PM    CO2 25 05/14/2023 12:33 PM    BUN 15 05/14/2023 12:33 PM    CREATININE 1.0 05/14/2023 12:33 PM    CALCIUM 9.1 05/14/2023 12:33 PM    LABGLOM >60 05/14/2023 12:33 PM    GLUCOSE 114 05/14/2023 12:33 PM         Pathology Report-    Pathology Report Path Number: [id number]    -- Diagnosis --  Skin right popliteal fossa, excision: Actinic keratosis and chronic  inflammation.  Negative for neoplasm.         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

## 2024-07-08 ENCOUNTER — OFFICE VISIT (OUTPATIENT)
Dept: SURGERY | Age: 79
End: 2024-07-08

## 2024-07-08 VITALS — TEMPERATURE: 98.3 F

## 2024-07-08 DIAGNOSIS — C44.320 SQUAMOUS CELL CARCINOMA, FACE: Primary | ICD-10-CM

## 2024-07-08 DIAGNOSIS — L98.9 SKIN LESION: ICD-10-CM

## 2024-07-08 PROCEDURE — 99024 POSTOP FOLLOW-UP VISIT: CPT | Performed by: PHYSICIAN ASSISTANT

## 2024-07-15 ENCOUNTER — TELEPHONE (OUTPATIENT)
Dept: SURGERY | Age: 79
End: 2024-07-15

## 2024-07-15 ENCOUNTER — OFFICE VISIT (OUTPATIENT)
Dept: SURGERY | Age: 79
End: 2024-07-15

## 2024-07-15 VITALS — TEMPERATURE: 98 F

## 2024-07-15 DIAGNOSIS — C44.320 SQUAMOUS CELL CARCINOMA, FACE: Primary | ICD-10-CM

## 2024-07-15 PROCEDURE — 99024 POSTOP FOLLOW-UP VISIT: CPT | Performed by: PHYSICIAN ASSISTANT

## 2024-07-15 NOTE — TELEPHONE ENCOUNTER
Patient had some bleeding with surgery site, per wife cleaned area, applied vaseline and gauze.    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    Instructed to keep area clean,apply  dressing,watch for any signs of infection, fever,warm to touch,increase swelling, discharge ,redness surgery site.    I offered a sooner appointment, wife stated she will call the office if needed.

## 2024-07-17 NOTE — PROGRESS NOTES
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 Denies fever, nausea, vomiting, leg pain or swelling, pain is absent.  The pt states that they have  kept the skin graft covered with bolster dressings sutured in place as well as Steri-Strips and Tegaderm to their donor site.   They state that their pain is absent.  He presents with his wife today having concerns that the right popliteal fossa full-thickness skin graft wound is not healing properly they wanted to ensure they are doing her dressing changes adequately.  He also brings to my attention again today the left popliteal fossa area which she has been keeping a gauze pad and thin layer of Vaseline.    Objective:    Temp 98 °F (36.7 °C) (Infrared)       Donor Wound: Clean, and intact.  No signs of infection, wound healing well    Right leg FTSG approximately 30% of the FTSG intact no concern for infection area of granulation tissue healing as expected    Neuro- Sensation  intact to antony wound sites with light touch     Left popliteal fossa no wounding is present at this time    Assessment:    Patient Active Problem List   Diagnosis    COVID-19    Squamous cell carcinoma, face       Labs: CBC:   Lab Results   Component Value Date/Time    WBC 13.5 05/14/2023 12:33 PM    RBC 4.19 05/14/2023 12:33 PM    HGB 13.3 05/14/2023 12:33 PM    HCT 39.3 05/14/2023 12:33 PM    MCV 93.8 05/14/2023 12:33 PM    MCH 31.7 05/14/2023 12:33 PM    MCHC 33.8 05/14/2023 12:33 PM    RDW 12.8 05/14/2023 12:33 PM     05/14/2023 12:33 PM    MPV 8.6 05/14/2023 12:33 PM     BMP:    Lab Results   Component Value Date/Time     05/14/2023 12:33 PM    K 4.3 05/14/2023 12:33 PM    CL 94 05/14/2023 12:33 PM    CO2 25 05/14/2023 12:33 PM    BUN 15 05/14/2023 12:33 PM    CREATININE 1.0 05/14/2023 12:33 PM    CALCIUM 9.1 05/14/2023 12:33 PM    LABGLOM >60 05/14/2023 12:33 PM    GLUCOSE 114

## 2024-07-29 ENCOUNTER — OFFICE VISIT (OUTPATIENT)
Dept: SURGERY | Age: 79
End: 2024-07-29

## 2024-07-29 VITALS — TEMPERATURE: 98.2 F

## 2024-07-29 DIAGNOSIS — L98.9 SKIN LESION: ICD-10-CM

## 2024-07-29 DIAGNOSIS — C44.320 SQUAMOUS CELL CARCINOMA, FACE: Primary | ICD-10-CM

## 2024-07-29 PROCEDURE — 99024 POSTOP FOLLOW-UP VISIT: CPT | Performed by: PHYSICIAN ASSISTANT

## 2024-07-30 ENCOUNTER — TELEPHONE (OUTPATIENT)
Dept: SURGERY | Age: 79
End: 2024-07-30

## 2024-07-30 NOTE — TELEPHONE ENCOUNTER
Patient called office stated he was suppose to get a cream from the pharmacy, pt unaware name or type of cream he stated maybe a steroid, please advise. [facility] is the correct pharmacy in patient's chart.

## 2024-07-31 RX ORDER — TRIAMCINOLONE ACETONIDE 1 MG/G
CREAM TOPICAL 2 TIMES DAILY
Qty: 45 G | Refills: 0 | Status: SHIPPED | OUTPATIENT
Start: 2024-07-31

## 2024-08-01 NOTE — PROGRESS NOTES
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 Denies fever, nausea, vomiting, leg pain or swelling, pain is absent.  He presents today for continued wound examination he and his wife state they continue with wet-to-dry dressings to the right popliteal fossa on his left his left popliteal fossa open to air he states he no longer has a lesion to the left popliteal fossa but does exhibit a rash at this time which he believes is secondary to extensive use of paper tape.  He also brings to my attention today dry flaking skin to his bilateral arms and chest    Objective:    Temp 98.2 °F (36.8 °C) (Infrared)       Donor Wound: Clean, and intact.  No signs of infection, wound healing well    Right leg FTSG approximately 30% of the FTSG intact no concern for infection area of granulation tissue healing as expected    Neuro- Sensation  intact to antony wound sites with light touch     Left popliteal fossa no wounding there is noted to be a erythematous rash approximately 10 cm x 10 cm without any signs or concerns of wounding likely second to the patient's use of adhesive tape    Assessment:    Patient Active Problem List   Diagnosis    COVID-19    Squamous cell carcinoma, face       Labs: CBC:   Lab Results   Component Value Date/Time    WBC 13.5 05/14/2023 12:33 PM    RBC 4.19 05/14/2023 12:33 PM    HGB 13.3 05/14/2023 12:33 PM    HCT 39.3 05/14/2023 12:33 PM    MCV 93.8 05/14/2023 12:33 PM    MCH 31.7 05/14/2023 12:33 PM    MCHC 33.8 05/14/2023 12:33 PM    RDW 12.8 05/14/2023 12:33 PM     05/14/2023 12:33 PM    MPV 8.6 05/14/2023 12:33 PM     BMP:    Lab Results   Component Value Date/Time     05/14/2023 12:33 PM    K 4.3 05/14/2023 12:33 PM    CL 94 05/14/2023 12:33 PM    CO2 25 05/14/2023 12:33 PM    BUN 15 05/14/2023 12:33 PM    CREATININE 1.0 05/14/2023 12:33 PM    CALCIUM 9.1 05/14/2023 12:33 PM    PADMAJA

## 2024-08-26 ENCOUNTER — OFFICE VISIT (OUTPATIENT)
Dept: SURGERY | Age: 79
End: 2024-08-26

## 2024-08-26 VITALS — TEMPERATURE: 99.1 F

## 2024-08-26 DIAGNOSIS — C44.320 SQUAMOUS CELL CARCINOMA, FACE: Primary | ICD-10-CM

## 2024-08-26 DIAGNOSIS — L98.9 SKIN LESION: ICD-10-CM

## 2024-08-26 PROCEDURE — 99024 POSTOP FOLLOW-UP VISIT: CPT | Performed by: PHYSICIAN ASSISTANT

## 2024-08-26 NOTE — PROGRESS NOTES
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 Denies fever, nausea, vomiting, leg pain or swelling, pain is absent.  He presents today for continued wound examination he continues with once daily Vaseline gauze pad to the right popliteal fossa on his left his left popliteal fossa open to air he states he no longer has a lesion to the left popliteal fossa and his rash to the left popliteal fossa has now gone after using his triamcinolone cream.      Objective:    Temp 99.1 °F (37.3 °C) (Infrared)       Donor Wound: Clean, and intact.  No signs of infection, wound healing well    Right leg FTSG continues to mature appropriately approximately 6 x 6 mm round superficial epidermolysis  Neuro- Sensation  intact to antony wound sites with light touch     Left popliteal fossa no wounding no rash noted    Assessment:    Patient Active Problem List   Diagnosis    COVID-19    Squamous cell carcinoma, face       Labs: CBC:   Lab Results   Component Value Date/Time    WBC 13.5 05/14/2023 12:33 PM    RBC 4.19 05/14/2023 12:33 PM    HGB 13.3 05/14/2023 12:33 PM    HCT 39.3 05/14/2023 12:33 PM    MCV 93.8 05/14/2023 12:33 PM    MCH 31.7 05/14/2023 12:33 PM    MCHC 33.8 05/14/2023 12:33 PM    RDW 12.8 05/14/2023 12:33 PM     05/14/2023 12:33 PM    MPV 8.6 05/14/2023 12:33 PM     BMP:    Lab Results   Component Value Date/Time     05/14/2023 12:33 PM    K 4.3 05/14/2023 12:33 PM    CL 94 05/14/2023 12:33 PM    CO2 25 05/14/2023 12:33 PM    BUN 15 05/14/2023 12:33 PM    CREATININE 1.0 05/14/2023 12:33 PM    CALCIUM 9.1 05/14/2023 12:33 PM    LABGLOM >60 05/14/2023 12:33 PM    GLUCOSE 114 05/14/2023 12:33 PM         Pathology Report-    Pathology Report Path Number: [id number]    -- Diagnosis --  Skin right popliteal fossa, excision: Actinic keratosis and chronic  inflammation.  Negative for neoplasm.         Plan:

## 2025-03-26 PROCEDURE — 88305 TISSUE EXAM BY PATHOLOGIST: CPT | Performed by: DERMATOLOGY

## 2025-03-27 ENCOUNTER — LAB REQUISITION (OUTPATIENT)
Dept: DERMATOPATHOLOGY | Facility: CLINIC | Age: 80
End: 2025-03-27
Payer: COMMERCIAL

## 2025-03-27 DIAGNOSIS — L98.9 DISORDER OF THE SKIN AND SUBCUTANEOUS TISSUE, UNSPECIFIED: ICD-10-CM

## 2025-03-28 LAB
LABORATORY COMMENT REPORT: NORMAL
PATH REPORT.FINAL DX SPEC: NORMAL
PATH REPORT.GROSS SPEC: NORMAL
PATH REPORT.MICROSCOPIC SPEC OTHER STN: NORMAL
PATH REPORT.RELEVANT HX SPEC: NORMAL
PATH REPORT.TOTAL CANCER: NORMAL

## 2025-04-09 ENCOUNTER — OFFICE VISIT (OUTPATIENT)
Dept: SURGERY | Age: 80
End: 2025-04-09
Payer: COMMERCIAL

## 2025-04-09 VITALS
OXYGEN SATURATION: 98 % | RESPIRATION RATE: 22 BRPM | TEMPERATURE: 97.9 F | SYSTOLIC BLOOD PRESSURE: 138 MMHG | DIASTOLIC BLOOD PRESSURE: 78 MMHG | HEART RATE: 66 BPM

## 2025-04-09 DIAGNOSIS — L98.9 SKIN LESION OF LEFT LEG: Primary | ICD-10-CM

## 2025-04-09 PROCEDURE — 99213 OFFICE O/P EST LOW 20 MIN: CPT | Performed by: PLASTIC SURGERY

## 2025-04-09 PROCEDURE — 1159F MED LIST DOCD IN RCRD: CPT | Performed by: PLASTIC SURGERY

## 2025-04-09 PROCEDURE — 1123F ACP DISCUSS/DSCN MKR DOCD: CPT | Performed by: PLASTIC SURGERY

## 2025-04-09 NOTE — PROGRESS NOTES
Department of Plastic Surgery - Adult  Attending Consult Note      CHIEF COMPLAINT:   Basal Cell Cancer of right hand lesion of left lateral leg    History Obtained From:  patient    HISTORY OF PRESENT ILLNESS:                The patient is a 79 y.o. male who presents with biopsy proven basal cell cancer of right hand.  The patient states that they first noticed the lesion several months ago.  It has  grown in size since they first noticed the lesion.  The lesion has  changed in color and has not had discharge or bleeding.  The pt has had the lesion biopsied previously.  The patient has not had the lesion removed previously. The patient states the lesion is not painfull.  The pt denies any associated symptoms    Patient also brings to my attention a raised lesion of the left lateral leg that he states began after hitting his leg on a glass table.  He was seen by dermatology and given steroids.  He states he could not fill the steroids because of expense.  He has been just placing zinc oxide ointment on it.    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  Allergies:  Patient has no known allergies.    Social History:   Social History     Socioeconomic History    Marital status:      Spouse name: Not on file    Number of children: Not on file    Years

## 2025-04-14 ENCOUNTER — TELEPHONE (OUTPATIENT)
Dept: SURGERY | Age: 80
End: 2025-04-14

## 2025-04-15 ENCOUNTER — TELEPHONE (OUTPATIENT)
Dept: SURGERY | Age: 80
End: 2025-04-15

## 2025-04-15 NOTE — TELEPHONE ENCOUNTER
Fyi: Patient notified office seeing [facility] at [facility] on Thursday, April 17th 9am, medical clearance to be faxed from there.

## 2025-04-17 LAB — SURGICAL PATHOLOGY REPORT: NORMAL

## 2025-04-21 ENCOUNTER — SCHEDULED TELEPHONE ENCOUNTER (OUTPATIENT)
Dept: SURGERY | Age: 80
End: 2025-04-21

## 2025-04-21 DIAGNOSIS — C44.92 SCC (SQUAMOUS CELL CARCINOMA): Primary | ICD-10-CM

## 2025-04-21 NOTE — PROGRESS NOTES
Kyler Dean is a 79 y.o. male evaluated via telephone on 4/21/2025.      Consent:  He and/or health care decision maker is aware that that he may receive a bill for this telephone service, depending on his insurance coverage, and has provided verbal consent to proceed: Yes    The patient was in the state [facility] during the entirety of the phone conversation.      Documentation:  I communicated with the patient and/or health care decision maker about the pathology results from their most recent biopsy.   Details of this discussion including any medical advice provided:     Pathology Path Number: [id number]     -- Diagnosis --   Skin, left lower leg: Invasive well-differentiated squamous cell   carcinoma involving deep margins of excision       I informed the patient of his pathology results we will plan to add excision the left lower leg squamous cell carcinoma as previously discussed with Dr. Thierno Payan to his planned surgery.  He voices understanding        I affirm this is a Patient Initiated Episode with a Patient who has not had a related appointment within my department in the past 7 days or scheduled within the next 24 hours.    Patient identification was verified at the start of the visit: Yes    Total Time: minutes: <5 minutes (not billable)    The visit was conducted pursuant to the emergency declaration under the [place] Act and the National Emergencies Act, 1135 waiver authority and the Coronavirus Preparedness and Response Supplemental Appropriations Act.  Patient identification was verified, and a caregiver was present when appropriate. The patient was located in a state where the provider was credentialed to provide care.    Note: not billable if this call serves to triage the patient into an appointment for the relevant concern      HELEN Stevenson

## 2025-04-23 ENCOUNTER — TELEPHONE (OUTPATIENT)
Dept: SURGERY | Age: 80
End: 2025-04-23

## 2025-04-23 NOTE — TELEPHONE ENCOUNTER
Kyler stopped in office on 4/23/25 and wanted to know if he could be prescribed a generic form of Efudex. He said he likes to have some on hand for new spots that may appear.

## 2025-04-29 ENCOUNTER — PREP FOR PROCEDURE (OUTPATIENT)
Dept: SURGERY | Age: 80
End: 2025-04-29

## 2025-04-29 DIAGNOSIS — C44.92 SCC (SQUAMOUS CELL CARCINOMA): ICD-10-CM

## 2025-04-29 PROBLEM — C44.91 BCC (BASAL CELL CARCINOMA): Status: ACTIVE | Noted: 2025-04-29

## 2025-04-29 NOTE — TELEPHONE ENCOUNTER
Medical clearance faxed 4/29/2025  [facility] dept    Outpatient surgery excision of basal cell carcinoma of right dorsal hand squamous cell carcinoma left lower leg with possible full thickness skin graft   Surgery has been scheduled at [address] on 5/6/2025, Pre-Admission Testing will call you prior to surgery to inform you arrival time and any other additional directions,if they are unable to reach you,please call them two days prior at [phone].  If taking Fish Oil, Vitamins, two weeks prior to surgery stop taking. If taking NSAIDS (such as Aspirin, Ibuprofen) anticoagulants please consult with your prescribing physician to get further instructions on when to stop medication prior to surgery that is scheduled, patient understood.

## 2025-04-30 NOTE — PROGRESS NOTES
[facility]   PRE-ADMISSION TESTING GENERAL INSTRUCTIONS  PAT Phone Number: [phone]      GENERAL INSTRUCTIONS:    [x] Antibacterial Soap Shower Night before AND the Morning of procedure.  [x] Do not wear lotions, powders, deodorant the morning of surgery.  [x] No solid food after midnight. You may have SIPS of clear liquids up until 2 hours before your arrival time to the hospital.   [x] You may brush your teeth, gargle, but do not swallow water.   [x] No tobacco products, illegal drugs, or alcohol within 24 hours of your surgery.  [x] Jewelry or valuables should not be brought to the hospital. All body and/or tongue piercing's must be removed prior to arriving to hospital. No contact lens or hair pins.   [x] Arrange transportation with a responsible adult  to and from the hospital. Arrange for someone to be with you for the remainder of the day and for 24 hours after your procedure due to having had anesthesia.          -Who will be your  for transportation? wife        -Who will be staying with you for 24 hrs after your procedure? wife  [x] Bring insurance card and photo ID.  [x] Bring copy of living will or healthcare power of  papers to be placed in your electronic record.      PARKING INSTRUCTIONS:     [x] ARRIVAL DATE & TIME: 5/6 at 7:30 am  [x] Times are subject to change. We will contact you the business day before surgery if that were to occur.  [x] Enter into the [facility] Entrance. Two people may accompany you. Masks are not required.  [x] Parking Lot \"I\" is where you will park. It is located on the corner of [address] and [address]. The entrance is on [address].   Only one vehicle - per patient, is permitted in parking lot.   Upon entering the parking lot, a voucher ticket will print.      MEDICATION INSTRUCTIONS:    [x] Bring a complete list of your medications, please write the last time you took the medicine, give this list to

## 2025-05-02 NOTE — H&P
Department of Plastic Surgery - Adult  Attending Consult Note        CHIEF COMPLAINT:   Basal Cell Cancer of right hand lesion of left lateral leg     History Obtained From:  patient     HISTORY OF PRESENT ILLNESS:                 The patient is a 79 y.o. male who presents with biopsy proven basal cell cancer of right hand.  The patient states that they first noticed the lesion several months ago.  It has  grown in size since they first noticed the lesion.  The lesion has  changed in color and has not had discharge or bleeding.  The pt has had the lesion biopsied previously.  The patient has not had the lesion removed previously. The patient states the lesion is not painfull.  The pt denies any associated symptoms     Patient also brings to my attention a raised lesion of the left lateral leg that he states began after hitting his leg on a glass table.  He was seen by dermatology and given steroids.  He states he could not fill the steroids because of expense.  He has been just placing zinc oxide ointment on it.     Past Medical History:    Past Medical History        Past Medical History:   Diagnosis Date    Known health problems: none       none per pt    Sleep apnea       BI-PAP         Past Surgical History:    Past Surgical History         Past Surgical History:   Procedure Laterality Date    ARM SURGERY Right 12/21/2021     EXC OF SCC RIGHT FORSAL HAND WITH FULL THICKNES SKIN GRAFT performed by Thierno Payan MD at [facility]    HERNIA REPAIR         abdominal in 2003 or 2004 per pt    LEG BIOPSY EXCISION Right 6/11/2024     Excision of Squamous Cell Carcinoma of Right Popliteal Fossa with Full Thickness Skin Graft performed by Thierno Payan MD at [facility]    LEG SURGERY         right leg scc           Current Medications:   Current Hospital Medications   No current facility-administered medications for this visit.     Allergies:  Patient has no known allergies.     Social History:   Social History                Socioeconomic History    Marital status:        Spouse name: Not on file    Number of children: Not on file    Years of education: Not on file    Highest education level: Not on file   Occupational History    Not on file   Tobacco Use    Smoking status: Every Day       Current packs/day: 1.00       Average packs/day: 1 pack/day for 60.0 years (60.0 ttl pk-yrs)       Types: Cigarettes    Smokeless tobacco: Never   Vaping Use    Vaping status: Never Used   Substance and Sexual Activity    Alcohol use: Not Currently    Drug use: Never    Sexual activity: Not on file   Other Topics Concern    Not on file   Social History Narrative    Not on file      Social Drivers of Health      Financial Resource Strain: Not on file   Food Insecurity: Not on file   Transportation Needs: Not on file   Physical Activity: Not on file   Stress: Not on file   Social Connections: Not on file   Intimate Partner Violence: Not on file   Housing Stability: Not on file         Family History:   Family History         Family History   Problem Relation Age of Onset    No Known Problems Mother      Pacemaker Father              REVIEW OF SYSTEMS:    CONSTITUTIONAL:  negative for  fevers, chills, sweats and fatigue  EYES: negative for dipolpia or acute vision loss.   RESPIRATORY:  negative for  dry cough, cough with sputum, dyspnea, wheezing and chest pain  HENT:negative for pain, headache, difficulty swallowing or nose bleeds.  CARDIOVASCULAR:  negative for  chest pain, dyspnea, palpitations, syncope  GASTROINTESTINAL:  negative for nausea, vomiting, change in bowel habits, diarrhea, constipation and abdominal pain  EXTREMITIES: negative for edema  MUSCULOSKELETAL: negative for muscle weakness  SKIN: positive for lesion, negative for itching or rashes.  HEME: negative for easy brusing or bleeding  BEHAVIOR/PSYCH:  negative for poor appetite, increased appetite, decreased sleep and poor concentration  All other systems negative

## 2025-05-06 ENCOUNTER — ANESTHESIA (OUTPATIENT)
Dept: OPERATING ROOM | Age: 80
End: 2025-05-06
Payer: COMMERCIAL

## 2025-05-06 ENCOUNTER — HOSPITAL ENCOUNTER (OUTPATIENT)
Age: 80
Setting detail: OUTPATIENT SURGERY
Discharge: HOME | End: 2025-05-06
Attending: PLASTIC SURGERY | Admitting: PLASTIC SURGERY
Payer: COMMERCIAL

## 2025-05-06 ENCOUNTER — ANESTHESIA EVENT (OUTPATIENT)
Dept: OPERATING ROOM | Age: 80
End: 2025-05-06
Payer: COMMERCIAL

## 2025-05-06 VITALS
TEMPERATURE: 98 F | RESPIRATION RATE: 17 BRPM | HEIGHT: 72 IN | OXYGEN SATURATION: 97 % | HEART RATE: 61 BPM | DIASTOLIC BLOOD PRESSURE: 61 MMHG | SYSTOLIC BLOOD PRESSURE: 132 MMHG | BODY MASS INDEX: 21.67 KG/M2 | WEIGHT: 160 LBS

## 2025-05-06 DIAGNOSIS — C44.91 BCC (BASAL CELL CARCINOMA): ICD-10-CM

## 2025-05-06 DIAGNOSIS — C44.92 SCC (SQUAMOUS CELL CARCINOMA): ICD-10-CM

## 2025-05-06 DIAGNOSIS — G89.18 POST-OP PAIN: Primary | ICD-10-CM

## 2025-05-06 PROCEDURE — 6360000002 HC RX W HCPCS: Performed by: NURSE ANESTHETIST, CERTIFIED REGISTERED

## 2025-05-06 PROCEDURE — 2709999900 HC NON-CHARGEABLE SUPPLY: Performed by: PLASTIC SURGERY

## 2025-05-06 PROCEDURE — 88305 TISSUE EXAM BY PATHOLOGIST: CPT

## 2025-05-06 PROCEDURE — 15220 FTH/GFT FR S/A/L 20 SQ CM/<: CPT | Performed by: PLASTIC SURGERY

## 2025-05-06 PROCEDURE — 6370000000 HC RX 637 (ALT 250 FOR IP): Performed by: PLASTIC SURGERY

## 2025-05-06 PROCEDURE — 2500000003 HC RX 250 WO HCPCS: Performed by: PLASTIC SURGERY

## 2025-05-06 PROCEDURE — 11623 EXC S/N/H/F/G MAL+MRG 2.1-3: CPT | Performed by: PLASTIC SURGERY

## 2025-05-06 PROCEDURE — 15240 FTH/GFT F/C/C/M/N/AX/G/H/F20: CPT | Performed by: PLASTIC SURGERY

## 2025-05-06 PROCEDURE — 2580000003 HC RX 258: Performed by: PLASTIC SURGERY

## 2025-05-06 PROCEDURE — 7100000011 HC PHASE II RECOVERY - ADDTL 15 MIN: Performed by: PLASTIC SURGERY

## 2025-05-06 PROCEDURE — 3700000001 HC ADD 15 MINUTES (ANESTHESIA): Performed by: PLASTIC SURGERY

## 2025-05-06 PROCEDURE — 3600000015 HC SURGERY LEVEL 5 ADDTL 15MIN: Performed by: PLASTIC SURGERY

## 2025-05-06 PROCEDURE — 3600000005 HC SURGERY LEVEL 5 BASE: Performed by: PLASTIC SURGERY

## 2025-05-06 PROCEDURE — 6360000002 HC RX W HCPCS: Performed by: PLASTIC SURGERY

## 2025-05-06 PROCEDURE — 11606 EXC TR-EXT MAL+MARG >4 CM: CPT | Performed by: PLASTIC SURGERY

## 2025-05-06 PROCEDURE — 7100000010 HC PHASE II RECOVERY - FIRST 15 MIN: Performed by: PLASTIC SURGERY

## 2025-05-06 PROCEDURE — 3700000000 HC ANESTHESIA ATTENDED CARE: Performed by: PLASTIC SURGERY

## 2025-05-06 RX ORDER — SODIUM CHLORIDE 0.9 % (FLUSH) 0.9 %
5-40 SYRINGE (ML) INJECTION PRN
Status: CANCELLED | OUTPATIENT
Start: 2025-05-06

## 2025-05-06 RX ORDER — SODIUM CHLORIDE 9 MG/ML
INJECTION, SOLUTION INTRAVENOUS PRN
Status: CANCELLED | OUTPATIENT
Start: 2025-05-06

## 2025-05-06 RX ORDER — SODIUM CHLORIDE 9 MG/ML
INJECTION, SOLUTION INTRAVENOUS PRN
Status: DISCONTINUED | OUTPATIENT
Start: 2025-05-06 | End: 2025-05-06 | Stop reason: HOSPADM

## 2025-05-06 RX ORDER — NALOXONE HYDROCHLORIDE 0.4 MG/ML
INJECTION, SOLUTION INTRAMUSCULAR; INTRAVENOUS; SUBCUTANEOUS PRN
Status: CANCELLED | OUTPATIENT
Start: 2025-05-06

## 2025-05-06 RX ORDER — TRAMADOL HYDROCHLORIDE 50 MG/1
100 TABLET ORAL PRN
Status: CANCELLED | OUTPATIENT
Start: 2025-05-06 | End: 2025-05-06

## 2025-05-06 RX ORDER — PHENYLEPHRINE HCL IN 0.9% NACL 1 MG/10 ML
SYRINGE (ML) INTRAVENOUS
Status: DISCONTINUED | OUTPATIENT
Start: 2025-05-06 | End: 2025-05-06 | Stop reason: SDUPTHER

## 2025-05-06 RX ORDER — GLYCOPYRROLATE 0.2 MG/ML
INJECTION INTRAMUSCULAR; INTRAVENOUS
Status: DISCONTINUED | OUTPATIENT
Start: 2025-05-06 | End: 2025-05-06 | Stop reason: SDUPTHER

## 2025-05-06 RX ORDER — BACITRACIN ZINC 500 [USP'U]/G
OINTMENT TOPICAL PRN
Status: DISCONTINUED | OUTPATIENT
Start: 2025-05-06 | End: 2025-05-06 | Stop reason: ALTCHOICE

## 2025-05-06 RX ORDER — SODIUM CHLORIDE 9 MG/ML
INJECTION, SOLUTION INTRAVENOUS CONTINUOUS
Status: DISCONTINUED | OUTPATIENT
Start: 2025-05-06 | End: 2025-05-06 | Stop reason: HOSPADM

## 2025-05-06 RX ORDER — SODIUM CHLORIDE 0.9 % (FLUSH) 0.9 %
5-40 SYRINGE (ML) INJECTION PRN
Status: DISCONTINUED | OUTPATIENT
Start: 2025-05-06 | End: 2025-05-06 | Stop reason: HOSPADM

## 2025-05-06 RX ORDER — TRAMADOL HYDROCHLORIDE 50 MG/1
50 TABLET ORAL PRN
Status: CANCELLED | OUTPATIENT
Start: 2025-05-06 | End: 2025-05-06

## 2025-05-06 RX ORDER — CEPHALEXIN 500 MG/1
500 CAPSULE ORAL 4 TIMES DAILY
Qty: 20 CAPSULE | Refills: 0 | Status: SHIPPED | OUTPATIENT
Start: 2025-05-06 | End: 2025-05-11

## 2025-05-06 RX ORDER — SODIUM CHLORIDE 0.9 % (FLUSH) 0.9 %
5-40 SYRINGE (ML) INJECTION EVERY 12 HOURS SCHEDULED
Status: DISCONTINUED | OUTPATIENT
Start: 2025-05-06 | End: 2025-05-06 | Stop reason: HOSPADM

## 2025-05-06 RX ORDER — PROPOFOL 10 MG/ML
INJECTION, EMULSION INTRAVENOUS
Status: DISCONTINUED | OUTPATIENT
Start: 2025-05-06 | End: 2025-05-06 | Stop reason: SDUPTHER

## 2025-05-06 RX ORDER — FENTANYL CITRATE 50 UG/ML
INJECTION, SOLUTION INTRAMUSCULAR; INTRAVENOUS
Status: DISCONTINUED | OUTPATIENT
Start: 2025-05-06 | End: 2025-05-06 | Stop reason: SDUPTHER

## 2025-05-06 RX ORDER — GINSENG 100 MG
CAPSULE ORAL
Qty: 14 G | Refills: 1 | Status: SHIPPED | OUTPATIENT
Start: 2025-05-06

## 2025-05-06 RX ORDER — SODIUM CHLORIDE 0.9 % (FLUSH) 0.9 %
5-40 SYRINGE (ML) INJECTION EVERY 12 HOURS SCHEDULED
Status: CANCELLED | OUTPATIENT
Start: 2025-05-06

## 2025-05-06 RX ORDER — ONDANSETRON 2 MG/ML
INJECTION INTRAMUSCULAR; INTRAVENOUS
Status: DISCONTINUED | OUTPATIENT
Start: 2025-05-06 | End: 2025-05-06 | Stop reason: SDUPTHER

## 2025-05-06 RX ORDER — LIDOCAINE HYDROCHLORIDE AND EPINEPHRINE 10; 10 MG/ML; UG/ML
INJECTION, SOLUTION INFILTRATION; PERINEURAL PRN
Status: DISCONTINUED | OUTPATIENT
Start: 2025-05-06 | End: 2025-05-06 | Stop reason: ALTCHOICE

## 2025-05-06 RX ORDER — HYDROCODONE BITARTRATE AND ACETAMINOPHEN 5; 325 MG/1; MG/1
1 TABLET ORAL EVERY 4 HOURS PRN
Qty: 15 TABLET | Refills: 0 | Status: SHIPPED | OUTPATIENT
Start: 2025-05-06 | End: 2025-05-11

## 2025-05-06 RX ADMIN — SODIUM CHLORIDE: 9 INJECTION, SOLUTION INTRAVENOUS at 10:05

## 2025-05-06 RX ADMIN — ONDANSETRON HYDROCHLORIDE 4 MG: 2 SOLUTION INTRAMUSCULAR; INTRAVENOUS at 10:18

## 2025-05-06 RX ADMIN — FENTANYL CITRATE 50 MCG: 50 INJECTION, SOLUTION INTRAMUSCULAR; INTRAVENOUS at 10:19

## 2025-05-06 RX ADMIN — GLYCOPYRROLATE 0.2 MG: 0.2 INJECTION INTRAMUSCULAR; INTRAVENOUS at 10:22

## 2025-05-06 RX ADMIN — CEFAZOLIN 2000 MG: 2 INJECTION, POWDER, FOR SOLUTION INTRAMUSCULAR; INTRAVENOUS at 10:22

## 2025-05-06 RX ADMIN — Medication 100 MCG: at 10:42

## 2025-05-06 RX ADMIN — Medication 100 MCG: at 10:51

## 2025-05-06 RX ADMIN — PROPOFOL 100 MCG/KG/MIN: 10 INJECTION, EMULSION INTRAVENOUS at 10:18

## 2025-05-06 RX ADMIN — SODIUM CHLORIDE: 0.9 INJECTION, SOLUTION INTRAVENOUS at 07:39

## 2025-05-06 ASSESSMENT — PAIN - FUNCTIONAL ASSESSMENT
PAIN_FUNCTIONAL_ASSESSMENT: 0-10

## 2025-05-06 ASSESSMENT — LIFESTYLE VARIABLES: SMOKING_STATUS: 1

## 2025-05-06 NOTE — ANESTHESIA PRE PROCEDURE
Department of Anesthesiology  Preprocedure Note       Name:  Kyler Dean   Age:  79 y.o.  :  1945                                          MRN:  [id number]         Date:  2025      Surgeon: Surgeon(s):  Thierno Payan MD    Procedure: Procedure(s):  excision of basal cell carcinoma of right dorsal hand squamous cell carcinoma left lower leg with possible fullthickness skin graft    Vital Signs (Current)   There were no vitals filed for this visit.    Vital Signs Statistics (for past 48 hrs)     No data recorded    BP Readings from Last 3 Encounters:   25 138/78   24 133/72   24 130/70     BMI  Body mass index is 22.38 kg/m².  Estimated body mass index is 22.38 kg/m² as calculated from the following:    Height as of this encounter: 1.829 m (6').    Weight as of this encounter: 74.8 kg (165 lb).    CBC   Lab Results   Component Value Date/Time    WBC 13.5 2023 12:33 PM    RBC 4.19 2023 12:33 PM    HGB 13.3 2023 12:33 PM    HCT 39.3 2023 12:33 PM    MCV 93.8 2023 12:33 PM    RDW 12.8 2023 12:33 PM     2023 12:33 PM     CMP    Lab Results   Component Value Date/Time     2023 12:33 PM    K 4.3 2023 12:33 PM    CL 94 2023 12:33 PM    CO2 25 2023 12:33 PM    BUN 15 2023 12:33 PM    CREATININE 1.0 2023 12:33 PM    LABGLOM >60 2023 12:33 PM    GLUCOSE 114 2023 12:33 PM    CALCIUM 9.1 2023 12:33 PM    BILITOT 1.7 2023 12:33 PM    ALKPHOS 112 2023 12:33 PM    AST 33 2023 12:33 PM    ALT 22 2023 12:33 PM     BMP    Lab Results   Component Value Date/Time     2023 12:33 PM    K 4.3 2023 12:33 PM    CL 94 2023 12:33 PM    CO2 25 2023 12:33 PM    BUN 15 2023 12:33 PM    CREATININE 1.0 2023 12:33 PM    CALCIUM 9.1 2023 12:33 PM    LABGLOM >60 2023 12:33 PM    GLUCOSE 114 2023 12:33 PM     POCGlucose  No results

## 2025-05-06 NOTE — DISCHARGE INSTRUCTIONS
Discharge Home.   Call office to schedule a follow-up appointment at [phone]  Please call to schedule an appointment to be seen In our office on Friday 5-16-25  Diet: regular diet  Activity: ambulate in house and no Strenuous exercise for 1 week  Shower/Bathing: Sponge bath only at this time no baths, hot tubs or soaking of the wound sites at this time.   Dressings /Splint /Wound Care:  Apply bacitracin two times daily to the wound site bolster dressings ( yellow dressing sutured into place ).  The bolster dressings will remain sutured into place over the skin graft until seen in our office.  He will also need to keep bacitracin twice daily over the suture line of the left upper thigh.  He can cover the areas of the right hand left lower leg and left upper leg with a gauze pad change as needed with saturation.      Driving: It is OK to drive if the following criteria are met:   1- Not taking narcotic pain medication   2- Not distracted by pain or limited ROM   3- Not a hazard to self or others on the road  Medications: As prescribed.  Educated to contact physician at [phone] with concerns or signs of infection (redness, increasing pain, discharge, odor, fever).

## 2025-05-06 NOTE — ANESTHESIA POSTPROCEDURE EVALUATION
Department of Anesthesiology  Postprocedure Note    Patient: Kyler Dean  MRN: [id number]  YOB: 1945  Date of evaluation: 5/6/2025    Procedure Summary       Date: 05/06/25 Room / Location: [address]    Anesthesia Start: 1005 Anesthesia Stop: 1116    Procedure: excision of basal cell carcinoma of right dorsal hand, squamous cell carcinoma left lower leg with full thickness skin graft at both sites (Hand) Diagnosis:       BCC (basal cell carcinoma)      SCC (squamous cell carcinoma)      (BCC (basal cell carcinoma) [C44.91])      (SCC (squamous cell carcinoma) [C44.92])    Surgeons: Thierno Payan MD Responsible Provider: Javan Hatch MD    Anesthesia Type: MAC ASA Status: 3            Anesthesia Type: No value filed.    Malu Phase I: Malu Score: 9    Malu Phase II: Malu Score: 10    Anesthesia Post Evaluation    Patient location during evaluation: PACU  Patient participation: complete - patient participated  Level of consciousness: awake and alert  Airway patency: patent  Nausea & Vomiting: no nausea and no vomiting  Cardiovascular status: hemodynamically stable  Respiratory status: acceptable  Hydration status: euvolemic  Multimodal analgesia pain management approach  Pain management: adequate    No notable events documented.

## 2025-05-07 NOTE — PROGRESS NOTES
Subjective:    Follow up today from  excision of basal cell carcinoma of right dorsal hand, squamous cell carcinoma left lower leg with full thickness skin graft at both sites 5/6/2025 . Denies fever, nausea, vomiting, leg pain or swelling, pain is absent.  The pt states that they have  kept the skin graft covered with bolster dressings sutured in place as well as Steri-Strips and Tegaderm to their donor site.   They state that their pain is absent. The patient states that they have  finished their antibiotic.     Objective:    There were no vitals taken for this visit.      Donor Wound: Clean, and intact.  No signs of infection, wound healing well, sutures intact    Right hand FTSG Wound Site : Bolster dressing intact , approximately 99% of the FTSG intact   Left hand FTSG Wound Site : Bolster dressing intact , approximately 99% of the FTSG intact   Neuro- Sensation  intact to antony wound sites with light touch     Assessment:    Patient Active Problem List   Diagnosis    COVID-19    Squamous cell carcinoma, face    BCC (basal cell carcinoma)    SCC (squamous cell carcinoma)    Post-op pain       Labs: CBC:   Lab Results   Component Value Date/Time    WBC 13.5 05/14/2023 12:33 PM    RBC 4.19 05/14/2023 12:33 PM    HGB 13.3 05/14/2023 12:33 PM    HCT 39.3 05/14/2023 12:33 PM    MCV 93.8 05/14/2023 12:33 PM    MCH 31.7 05/14/2023 12:33 PM    MCHC 33.8 05/14/2023 12:33 PM    RDW 12.8 05/14/2023 12:33 PM     05/14/2023 12:33 PM    MPV 8.6 05/14/2023 12:33 PM     BMP:    Lab Results   Component Value Date/Time     05/14/2023 12:33 PM    K 4.3 05/14/2023 12:33 PM    CL 94 05/14/2023 12:33 PM    CO2 25 05/14/2023 12:33 PM    BUN 15 05/14/2023 12:33 PM    CREATININE 1.0 05/14/2023 12:33 PM    CALCIUM 9.1 05/14/2023 12:33 PM    LABGLOM >60 05/14/2023 12:33 PM    GLUCOSE 114 05/14/2023 12:33 PM         Pathology Report- Path Number: [id number]     -- Diagnosis --   A.  Right hand, excision: Basal cell carcinoma,

## 2025-05-12 LAB — SURGICAL PATHOLOGY REPORT: NORMAL

## 2025-05-16 ENCOUNTER — OFFICE VISIT (OUTPATIENT)
Dept: SURGERY | Age: 80
End: 2025-05-16

## 2025-05-16 VITALS
RESPIRATION RATE: 20 BRPM | TEMPERATURE: 98.3 F | SYSTOLIC BLOOD PRESSURE: 104 MMHG | OXYGEN SATURATION: 95 % | HEART RATE: 57 BPM | DIASTOLIC BLOOD PRESSURE: 66 MMHG

## 2025-05-16 DIAGNOSIS — C44.92 SCC (SQUAMOUS CELL CARCINOMA): Primary | ICD-10-CM

## 2025-05-16 PROCEDURE — 99024 POSTOP FOLLOW-UP VISIT: CPT | Performed by: PHYSICIAN ASSISTANT

## 2025-05-22 ENCOUNTER — OFFICE VISIT (OUTPATIENT)
Dept: SURGERY | Age: 80
End: 2025-05-22

## 2025-05-22 VITALS
HEART RATE: 66 BPM | DIASTOLIC BLOOD PRESSURE: 68 MMHG | OXYGEN SATURATION: 99 % | TEMPERATURE: 97.2 F | SYSTOLIC BLOOD PRESSURE: 130 MMHG | RESPIRATION RATE: 24 BRPM

## 2025-05-22 DIAGNOSIS — C44.92 SCC (SQUAMOUS CELL CARCINOMA): Primary | ICD-10-CM

## 2025-05-22 PROCEDURE — 99024 POSTOP FOLLOW-UP VISIT: CPT | Performed by: PHYSICIAN ASSISTANT

## 2025-05-22 NOTE — PROGRESS NOTES
Subjective:    Follow up today from  excision of basal cell carcinoma of right dorsal hand, squamous cell carcinoma left lower leg with full thickness skin graft at both sites 5/6/2025 . Denies fever, nausea, vomiting, leg pain or swelling, pain is absent.  He presents today for continued wound examination    Objective:    /68 (BP Site: Left Upper Arm, Patient Position: Sitting, BP Cuff Size: Medium Adult)   Pulse 66   Temp 97.2 °F (36.2 °C) (Infrared)   Resp 24   SpO2 99%       Donor Wound: Clean, and intact.  No signs of infection, wound healing well, sutures intact    Right hand FTSG Wound Site :  approximately 99% of the FTSG intact   Left hand FTSG Wound Site :  approximately 99% of the FTSG intact   Neuro- Sensation  intact to antony wound sites with light touch     Assessment:    Patient Active Problem List   Diagnosis    COVID-19    Squamous cell carcinoma, face    BCC (basal cell carcinoma)    SCC (squamous cell carcinoma)    Post-op pain       Labs: CBC:   Lab Results   Component Value Date/Time    WBC 13.5 05/14/2023 12:33 PM    RBC 4.19 05/14/2023 12:33 PM    HGB 13.3 05/14/2023 12:33 PM    HCT 39.3 05/14/2023 12:33 PM    MCV 93.8 05/14/2023 12:33 PM    MCH 31.7 05/14/2023 12:33 PM    MCHC 33.8 05/14/2023 12:33 PM    RDW 12.8 05/14/2023 12:33 PM     05/14/2023 12:33 PM    MPV 8.6 05/14/2023 12:33 PM     BMP:    Lab Results   Component Value Date/Time     05/14/2023 12:33 PM    K 4.3 05/14/2023 12:33 PM    CL 94 05/14/2023 12:33 PM    CO2 25 05/14/2023 12:33 PM    BUN 15 05/14/2023 12:33 PM    CREATININE 1.0 05/14/2023 12:33 PM    CALCIUM 9.1 05/14/2023 12:33 PM    LABGLOM >60 05/14/2023 12:33 PM    GLUCOSE 114 05/14/2023 12:33 PM         Pathology Report- Path Number: [id number]     -- Diagnosis --   A.  Right hand, excision: Basal cell carcinoma, nodular type   Surgical margins negative for malignancy     B.  Left lower leg, excision: Invasive, well-differentiated squamous   cell

## 2025-06-10 ENCOUNTER — OFFICE VISIT (OUTPATIENT)
Dept: SURGERY | Age: 80
End: 2025-06-10

## 2025-06-10 VITALS
SYSTOLIC BLOOD PRESSURE: 122 MMHG | HEART RATE: 57 BPM | OXYGEN SATURATION: 95 % | DIASTOLIC BLOOD PRESSURE: 76 MMHG | TEMPERATURE: 98.4 F

## 2025-06-10 DIAGNOSIS — C44.92 SCC (SQUAMOUS CELL CARCINOMA): Primary | ICD-10-CM

## 2025-06-10 DIAGNOSIS — C44.612 BASAL CELL CARCINOMA (BCC) OF SKIN OF RIGHT UPPER EXTREMITY INCLUDING SHOULDER: ICD-10-CM

## 2025-06-10 PROCEDURE — 99024 POSTOP FOLLOW-UP VISIT: CPT | Performed by: PHYSICIAN ASSISTANT

## 2025-06-10 NOTE — PROGRESS NOTES
Subjective:    Follow up today from  excision of basal cell carcinoma of right dorsal hand, squamous cell carcinoma left lower leg with full thickness skin graft at both sites 5/6/2025 . Denies fever, nausea, vomiting, leg pain or swelling, pain is absent.  He presents today for continued wound examination    Objective:    There were no vitals taken for this visit.      Donor Wound: Clean, and intact.  No signs of infection, wound healing well, sutures intact    Right hand FTSG Wound Site :  approximately 99% of the FTSG intact   Left leg FTSG Wound Site :  approximately 90% of the FTSG intact   Neuro- Sensation  intact to antony wound sites with light touch     Assessment:    Patient Active Problem List   Diagnosis    COVID-19    Squamous cell carcinoma, face    BCC (basal cell carcinoma)    SCC (squamous cell carcinoma)    Post-op pain       Labs: CBC:   Lab Results   Component Value Date/Time    WBC 13.5 05/14/2023 12:33 PM    RBC 4.19 05/14/2023 12:33 PM    HGB 13.3 05/14/2023 12:33 PM    HCT 39.3 05/14/2023 12:33 PM    MCV 93.8 05/14/2023 12:33 PM    MCH 31.7 05/14/2023 12:33 PM    MCHC 33.8 05/14/2023 12:33 PM    RDW 12.8 05/14/2023 12:33 PM     05/14/2023 12:33 PM    MPV 8.6 05/14/2023 12:33 PM     BMP:    Lab Results   Component Value Date/Time     05/14/2023 12:33 PM    K 4.3 05/14/2023 12:33 PM    CL 94 05/14/2023 12:33 PM    CO2 25 05/14/2023 12:33 PM    BUN 15 05/14/2023 12:33 PM    CREATININE 1.0 05/14/2023 12:33 PM    CALCIUM 9.1 05/14/2023 12:33 PM    LABGLOM >60 05/14/2023 12:33 PM    GLUCOSE 114 05/14/2023 12:33 PM         Pathology Report- Path Number: [id number]     -- Diagnosis --   A.  Right hand, excision: Basal cell carcinoma, nodular type   Surgical margins negative for malignancy     B.  Left lower leg, excision: Invasive, well-differentiated squamous   cell carcinoma (grade 1)   Surgical margins negative for malignancy     -- Diagnosis Comment --   In part A, there is no

## 2025-07-11 NOTE — PROGRESS NOTES
Subjective:    Follow up today from  excision of basal cell carcinoma of right dorsal hand, squamous cell carcinoma left lower leg with full thickness skin graft at both sites 5/6/2025 . Denies fever, nausea, vomiting, leg pain or swelling, pain is absent.  He presents today for continued wound examination    Objective:    There were no vitals taken for this visit.      Donor Wound: Clean, and intact.  No signs of infection, wound healing well, sutures intact    Right hand FTSG Wound Site : Well-healed there is some callus noted at the lateral aspect of the well-healed skin graft  Left leg FTSG Wound Site : Well-healed there is some callus noted at the lateral aspect of the well-healed skin graft  Neuro- Sensation  intact to antony wound sites with light touch     Assessment:    Patient Active Problem List   Diagnosis    COVID-19    Squamous cell carcinoma, face    BCC (basal cell carcinoma)    SCC (squamous cell carcinoma)    Post-op pain       Labs: CBC:   Lab Results   Component Value Date/Time    WBC 13.5 05/14/2023 12:33 PM    RBC 4.19 05/14/2023 12:33 PM    HGB 13.3 05/14/2023 12:33 PM    HCT 39.3 05/14/2023 12:33 PM    MCV 93.8 05/14/2023 12:33 PM    MCH 31.7 05/14/2023 12:33 PM    MCHC 33.8 05/14/2023 12:33 PM    RDW 12.8 05/14/2023 12:33 PM     05/14/2023 12:33 PM    MPV 8.6 05/14/2023 12:33 PM     BMP:    Lab Results   Component Value Date/Time     05/14/2023 12:33 PM    K 4.3 05/14/2023 12:33 PM    CL 94 05/14/2023 12:33 PM    CO2 25 05/14/2023 12:33 PM    BUN 15 05/14/2023 12:33 PM    CREATININE 1.0 05/14/2023 12:33 PM    CALCIUM 9.1 05/14/2023 12:33 PM    LABGLOM >60 05/14/2023 12:33 PM    GLUCOSE 114 05/14/2023 12:33 PM         Pathology Report- Path Number: [id number]     -- Diagnosis --   A.  Right hand, excision: Basal cell carcinoma, nodular type   Surgical margins negative for malignancy     B.  Left lower leg, excision: Invasive, well-differentiated squamous   cell carcinoma (grade 1)

## 2025-07-21 ENCOUNTER — OFFICE VISIT (OUTPATIENT)
Dept: SURGERY | Age: 80
End: 2025-07-21

## 2025-07-21 VITALS — TEMPERATURE: 98.2 F | HEART RATE: 66 BPM | OXYGEN SATURATION: 96 %

## 2025-07-21 DIAGNOSIS — C44.612 BASAL CELL CARCINOMA (BCC) OF SKIN OF RIGHT UPPER EXTREMITY INCLUDING SHOULDER: ICD-10-CM

## 2025-07-21 DIAGNOSIS — C44.92 SCC (SQUAMOUS CELL CARCINOMA): Primary | ICD-10-CM

## 2025-07-21 PROCEDURE — 99024 POSTOP FOLLOW-UP VISIT: CPT | Performed by: PHYSICIAN ASSISTANT

## (undated) DEVICE — CLOTH SURG PREP PREOPERATIVE CHLORHEXIDINE GLUC 2% READYPREP

## (undated) DEVICE — ELECTRODE PT RET AD L9FT HI MOIST COND ADH HYDRGEL CORDED

## (undated) DEVICE — TOWEL,OR,DSP,ST,BLUE,STD,6/PK,12PK/CS: Brand: MEDLINE

## (undated) DEVICE — DRESSING,GAUZE,XEROFORM,CURAD,5"X9",ST: Brand: CURAD

## (undated) DEVICE — PAD,NON-ADHERENT,2X3,STERILE,LF,1/PK: Brand: MEDLINE

## (undated) DEVICE — BANDAGE,GAUZE,4.5"X4.1YD,STERILE,LF: Brand: MEDLINE

## (undated) DEVICE — BANDAGE,GAUZE,CONFORMING,3"X75",STRL,LF: Brand: MEDLINE

## (undated) DEVICE — PAD,ABDOMINAL,5"X9",ST,LF,25/BX: Brand: MEDLINE INDUSTRIES, INC.

## (undated) DEVICE — 3M™ TEGADERM™ TRANSPARENT FILM DRESSING FRAME STYLE, 1626W, 4 IN X 4-3/4 IN (10 CM X 12 CM), 50/CT 4CT/CASE: Brand: 3M™ TEGADERM™

## (undated) DEVICE — Device

## (undated) DEVICE — SET INSTRUMENT MINOR PLASTICS

## (undated) DEVICE — NEEDLE HYPO 27GA L15IN REG BVL W O SFTY FOR SYR DISPOSABLE

## (undated) DEVICE — ADHESIVE SKIN CLOSURE TOP 36 CC HI VISC DERMBND MINI

## (undated) DEVICE — 3M™ STERI-STRIP™ REINFORCED ADHESIVE SKIN CLOSURES, R1548, 1 IN X 5 IN (25 MM X 125 MM), 4 STRIPS/ENVELOPE: Brand: 3M™ STERI-STRIP™

## (undated) DEVICE — STERILE POLYISOPRENE POWDER-FREE SURGICAL GLOVES: Brand: PROTEXIS

## (undated) DEVICE — SYRINGE MED 10ML TRNSLUC BRL PLUNG BLK MRK POLYPR CTRL

## (undated) DEVICE — 3M™ TEGADERM™ TRANSPARENT FILM DRESSING FRAME STYLE, 1626, 4 IN X 4-3/4 IN (10 CM X 12 CM), 50/CT 4CT/CASE: Brand: 3M™ TEGADERM™

## (undated) DEVICE — TRAY,SKIN SCRUB,DRY,W/GAUZE: Brand: MEDLINE

## (undated) DEVICE — GOWN,SIRUS,FABRNF,XL,20/CS: Brand: MEDLINE

## (undated) DEVICE — DRAPE,REIN 53X77,STERILE: Brand: MEDLINE

## (undated) DEVICE — SURGICAL PROCEDURE PACK BASIC

## (undated) DEVICE — UNIVERSAL DRAPE: Brand: MEDLINE INDUSTRIES, INC.

## (undated) DEVICE — NEEDLE HYPO 27GA L1.25IN GRY POLYPR HUB S STL REG BVL STR

## (undated) DEVICE — TUBING SUCT 12FR MAL ALUM SHFT FN CAP VENT UNIV CONN W/ OBT

## (undated) DEVICE — DRAPE,HAND,STERILE: Brand: MEDLINE

## (undated) DEVICE — MICRODISSECTION NEEDLE STRAIGHT SLEEVE: Brand: COLORADO

## (undated) DEVICE — STRAP POS MP 30X3 IN HK LOOP CLOSURE FOAM DISP

## (undated) DEVICE — COTTON STRIP: Brand: DEROYAL

## (undated) DEVICE — HEAD AND NECK: Brand: MEDLINE INDUSTRIES, INC.

## (undated) DEVICE — BLADE,STAINLESS-STEEL,15,STRL,DISPOSABLE: Brand: MEDLINE

## (undated) DEVICE — INTENDED FOR TISSUE SEPARATION, AND OTHER PROCEDURES THAT REQUIRE A SHARP SURGICAL BLADE TO PUNCTURE OR CUT.: Brand: BARD-PARKER ® STAINLESS STEEL BLADES

## (undated) DEVICE — MARKER SURG SKIN FULL SZ PUR TRAD INK REGULAR ULTRA FN TWIN

## (undated) DEVICE — Z CONVERTED USE 2276060 DRESSING NONADHESIVE W3XL4IN WHT COT OUCHLSS RECT BONDED

## (undated) DEVICE — APPLICATOR MEDICATED 26 CC SOLUTION HI LT ORNG CHLORAPREP

## (undated) DEVICE — LIQUIBAND RAPID ADHESIVE 36/CS 0.8ML: Brand: MEDLINE

## (undated) DEVICE — WIPES SKIN CLOTH READYPREP 9 X 10.5 IN 2% CHLORHEX GLUCONATE CHG PREOP